# Patient Record
Sex: MALE | Race: WHITE | NOT HISPANIC OR LATINO | Employment: FULL TIME | URBAN - METROPOLITAN AREA
[De-identification: names, ages, dates, MRNs, and addresses within clinical notes are randomized per-mention and may not be internally consistent; named-entity substitution may affect disease eponyms.]

---

## 2017-08-13 ENCOUNTER — OFFICE VISIT (OUTPATIENT)
Dept: URGENT CARE | Facility: CLINIC | Age: 57
End: 2017-08-13
Payer: COMMERCIAL

## 2017-08-13 VITALS
HEIGHT: 71 IN | WEIGHT: 152 LBS | RESPIRATION RATE: 16 BRPM | SYSTOLIC BLOOD PRESSURE: 111 MMHG | DIASTOLIC BLOOD PRESSURE: 74 MMHG | TEMPERATURE: 97 F | BODY MASS INDEX: 21.28 KG/M2 | OXYGEN SATURATION: 99 % | HEART RATE: 60 BPM

## 2017-08-13 DIAGNOSIS — S46.211A BICEPS MUSCLE STRAIN, RIGHT, INITIAL ENCOUNTER: Primary | ICD-10-CM

## 2017-08-13 PROCEDURE — 3008F BODY MASS INDEX DOCD: CPT | Mod: S$GLB,,, | Performed by: NURSE PRACTITIONER

## 2017-08-13 PROCEDURE — 99203 OFFICE O/P NEW LOW 30 MIN: CPT | Mod: S$GLB,,, | Performed by: NURSE PRACTITIONER

## 2017-08-13 RX ORDER — WARFARIN 7.5 MG/1
7.5 TABLET ORAL DAILY
COMMUNITY
End: 2017-12-29 | Stop reason: SDUPTHER

## 2017-08-13 RX ORDER — WARFARIN 10 MG/1
10 TABLET ORAL DAILY
COMMUNITY
End: 2017-12-29 | Stop reason: SDUPTHER

## 2017-08-13 NOTE — PROGRESS NOTES
Subjective:       Patient ID: Jigar Saxena is a 57 y.o. male.    Chief Complaint: Shoulder Injury (Right shoulder)    Patient states he hurt right shoulder a week ago picking up water and reinjuried it yesterday working out.Patient does take Coumadin. Wants to make sure it is not still bleeding. reinjured it yesterday while doing pull ups      Shoulder Injury    The incident occurred at the gym and at home. The right shoulder is affected. The incident occurred more than 1 week ago. Injury mechanism: lifting case of water,working out. The quality of the pain is described as stabbing (some times dull ache). The pain does not radiate. The pain is at a severity of 7/10. The pain is moderate. Pertinent negatives include no chest pain or numbness. He has tried ice and acetaminophen for the symptoms. The treatment provided mild relief.     Review of Systems   Constitution: Negative for weakness and malaise/fatigue.   HENT: Negative for nosebleeds.    Cardiovascular: Negative for chest pain and syncope.   Respiratory: Negative for shortness of breath.    Skin: Positive for color change (bruising to the right medial bicep).   Musculoskeletal: Positive for joint pain (right upper arm pain). Negative for back pain and neck pain.   Gastrointestinal: Negative for abdominal pain.   Genitourinary: Negative for hematuria.   Neurological: Negative for dizziness and numbness.   All other systems reviewed and are negative.      Objective:      Physical Exam   Constitutional: He is oriented to person, place, and time. He appears well-developed and well-nourished.   HENT:   Head: Normocephalic and atraumatic.   Eyes: Conjunctivae and EOM are normal. Pupils are equal, round, and reactive to light.   Neck: Normal range of motion. Neck supple.   Cardiovascular: Normal rate.    Pulmonary/Chest: Effort normal.   Musculoskeletal: Normal range of motion. He exhibits tenderness (right medial bicep. pt has FROM of the arm and shoulder, equal   bilat. ). He exhibits no edema or deformity.        Right shoulder: He exhibits normal range of motion and no tenderness.        Arms:  Neurological: He is alert and oriented to person, place, and time. He has normal reflexes.   Skin: Skin is warm and dry. Capillary refill takes less than 2 seconds. Bruising noted.        Psychiatric: He has a normal mood and affect.   Nursing note and vitals reviewed.      Assessment:       1. Biceps muscle strain, right, initial encounter        Plan:       Jigar was seen today for shoulder injury.    Diagnoses and all orders for this visit:    Biceps muscle strain, right, initial encounter  -     Ambulatory referral to Orthopedics

## 2017-08-13 NOTE — PATIENT INSTRUCTIONS
An Orthopedic referral has been placed for you. If symptoms do not resolve please follow up.   Please use Tylenol for your pain.   Rest, ice, and elevate the affected extremity.   Please avoid heavy lifting until symptoms resolve or you are seen by Orthopedics.    Muscle Strain in the Extremities  A muscle strain is a stretching and tearing of muscle fibers. This causes pain, especially when you move that muscle. There may also be some swelling and bruising.  Home care  · Keep the hurt area raised to reduce pain and swelling. This is especially important during the first 48 hours.  · Apply an ice pack over the injured area for 15 to 20 minutes every 3 to 6 hours. You should do this for the first 24 to 48 hours. You can make an ice pack by filling a plastic bag that seals at the top with ice cubes and then wrapping it with a thin towel. Be careful not to injure your skin with the ice treatments. Ice should never be applied directly to skin. Continue the use of ice packs for relief of pain and swelling as needed. After 48 hours, apply heat (warm shower or warm bath) for 15 to 20 minutes several times a day, or alternate ice and heat.  · You may use over-the-counter pain medicine to control pain, unless another medicine was prescribed. If you have chronic liver or kidney disease or ever had a stomach ulcer or GI bleeding, talk with your healthcare provider before using these medicines.  · For leg strains: If crutches have been recommended, dont put full weight on the hurt leg until you can do so without pain. You can return to sports when you are able to hop and run on the injured leg without pain.  Follow-up care  Follow up with your healthcare provider, or as advised.  When to seek medical advice  Call your healthcare provider right away if any of these occur:  · The toes of the injured leg become swollen, cold, blue, numb, or tingly  · Pain or swelling increases  Date Last Reviewed: 11/19/2015  © 5131-0418 [employer]. [address] [place] [id number]. All rights reserved. This information is not intended as a substitute for professional medical care. Always follow your healthcare professional's instructions.        When Your Child Has a Strain, Sprain, or Contusion  Strains, sprains, and contusions are common injuries in active children. These injuries are similar, but involve different types of body tissue. Most of these injuries happen during sports or active play. But they can happen at any time. A strain, sprain, or contusion can be painful. With the right treatment, most heal with no lasting problems.        A strain is damage to a muscle or tendon.         A sprain is damage to a ligament.         A contusion (bruise) is caused by damage to blood vessels in and under the skin.      What is a strain?  A strain is an injury to a muscle or to a tendon (tissue that connects muscle to bone). It is sometimes called a pulled muscle. A strain happens when a muscle or tendon is stretched too far or is partially torn. Symptoms of a strain are pain, swelling, and having a problem moving or using the injured area. The hamstring (thigh muscle), calf muscle, and Achilles tendon are commonly strained.   What is a sprain?  A sprain is an injury to a ligament (tissue that connects bones to other bones). Joints contain many ligaments. A sprain results when a joint is twisted or pulled and the ligament stretches or tears. Symptoms of a sprain are pain, swelling, and having a problem moving or using the injured area. Ankles, knees, and wrists are the joints most commonly sprained.   What is a contusion?  A contusion is commonly called a bruise. It is injury to tissue that causes bleeding without breaking the skin. It is often a result of being hit by a blunt object, such as a ball or bat. Symptoms of a contusion are discoloration of the skin, pain (which can be severe), and swelling. Contusions usually arent  serious and usually dont need medical attention. But a large, painful, or very swollen bruise, or a bruise that limits movement of a joint such as the knee, should be seen by a healthcare provider.   How are strains, sprains, and contusions diagnosed?  The healthcare provider asks about your childs symptoms and medical history. An exam is also done. An X-ray (test that creates images of bones) may be done to rule out broken bones.  How are strains, sprains, and contusions treated?  · Strains and sprains can take up to months to heal. If not treated and allowed to heal, a strain or sprain can lead to long-term problems. These include lasting pain and stiffness. So it is important to follow the healthcare providers instructions.  · The pain of a contusion often resolves within the first week. But the swelling and discoloration may take weeks to go away.  Treatment consists of one or more of the following:  · RICE (which stands for Rest, Ice, Compression, and Elevation)  ¨ Rest. As much as possible, the child should not use the injured area. In some cases, your child may be given a brace or sling to keep an injured joint still. Your child may also be given crutches to keep some weight off a strain to the leg or a sprain to the ankle or knee.  ¨ Ice. Put ice on the injured area 3 to 4 times a day for 20 minutes at a time. Use an ice pack or bag of frozen peas wrapped in a thin towel. Never put ice directly on your child's skin.  ¨ Compression. If instructed, wrap the area to keep swelling down. Use an elastic bandage. Do this only as instructed by your childs healthcare provider.  ¨ Elevation. Have your child raise the injured body part above the level of his or her heart.  · Medicines to relieve inflammation and pain. These will likely be NSAIDs (nonsteroidal anti-inflammatory medicines). NSAIDs include ibuprofen and naproxen. Give these medicines to your child only as directed by your hospitals healthcare  provider.  · Physical therapy (PT) to strengthen the injured area. This is especially helpful for moderate to severe strains or sprains.  · Casting of the affected area to keep it still and allow the strain or sprain to heal.  · Surgery may be needed if the strain or sprain is severe and there is tearing. During surgery, the torn muscle, tendon, or ligament is repaired.  What are the long-term concerns?  If allowed to heal, most strains, sprains, and contusions cause no further problems. Strains or sprains that are not treated and dont heal properly can lead to pain or stiffness that doesnt go away. Be sure to follow your childs treatment plan. Your childs healthcare provider can tell you more about the expected outcome based on your childs injury.     Preventing strains, sprains, and contusions  If playing sports or doing other athletic activity, be sure your child:  · Has proper training.  · Wears protective gear.  · Warms up before activity and cools down afterward.  · Uses proper equipment.  · Doesnt play hurt (with an injury).   Date Last Reviewed: 11/18/2015 © 2000-2016 [employer]. [address] [place] [address]. All rights reserved. This information is not intended as a substitute for professional medical care. Always follow your healthcare professional's instructions.

## 2017-09-26 ENCOUNTER — OFFICE VISIT (OUTPATIENT)
Dept: FAMILY MEDICINE | Facility: CLINIC | Age: 57
End: 2017-09-26
Payer: COMMERCIAL

## 2017-09-26 ENCOUNTER — ANTI-COAG VISIT (OUTPATIENT)
Dept: CARDIOLOGY | Facility: CLINIC | Age: 57
End: 2017-09-26

## 2017-09-26 ENCOUNTER — LAB VISIT (OUTPATIENT)
Dept: LAB | Facility: HOSPITAL | Age: 57
End: 2017-09-26
Attending: INTERNAL MEDICINE
Payer: COMMERCIAL

## 2017-09-26 VITALS
DIASTOLIC BLOOD PRESSURE: 78 MMHG | SYSTOLIC BLOOD PRESSURE: 104 MMHG | TEMPERATURE: 98 F | BODY MASS INDEX: 21.11 KG/M2 | WEIGHT: 149.25 LBS

## 2017-09-26 DIAGNOSIS — Z79.01 CHRONIC ANTICOAGULATION: ICD-10-CM

## 2017-09-26 DIAGNOSIS — Z00.00 ANNUAL PHYSICAL EXAM: Primary | ICD-10-CM

## 2017-09-26 DIAGNOSIS — D68.59 PROTEIN S DEFICIENCY: ICD-10-CM

## 2017-09-26 DIAGNOSIS — Z79.01 LONG TERM (CURRENT) USE OF ANTICOAGULANTS: ICD-10-CM

## 2017-09-26 DIAGNOSIS — L21.9 SEBORRHEIC DERMATITIS: ICD-10-CM

## 2017-09-26 DIAGNOSIS — Z23 NEED FOR PROPHYLACTIC VACCINATION AND INOCULATION AGAINST INFLUENZA: ICD-10-CM

## 2017-09-26 DIAGNOSIS — Z00.00 ANNUAL PHYSICAL EXAM: ICD-10-CM

## 2017-09-26 LAB
ALBUMIN SERPL BCP-MCNC: 4 G/DL
ALP SERPL-CCNC: 62 U/L
ALT SERPL W/O P-5'-P-CCNC: 28 U/L
ANION GAP SERPL CALC-SCNC: 6 MMOL/L
AST SERPL-CCNC: 27 U/L
BASOPHILS # BLD AUTO: 0.01 K/UL
BASOPHILS NFR BLD: 0.2 %
BILIRUB SERPL-MCNC: 0.3 MG/DL
BUN SERPL-MCNC: 23 MG/DL
CALCIUM SERPL-MCNC: 9.4 MG/DL
CHLORIDE SERPL-SCNC: 104 MMOL/L
CHOLEST SERPL-MCNC: 208 MG/DL
CHOLEST/HDLC SERPL: 2.8 {RATIO}
CO2 SERPL-SCNC: 29 MMOL/L
CREAT SERPL-MCNC: 1.1 MG/DL
DIFFERENTIAL METHOD: ABNORMAL
EOSINOPHIL # BLD AUTO: 0.1 K/UL
EOSINOPHIL NFR BLD: 1.8 %
ERYTHROCYTE [DISTWIDTH] IN BLOOD BY AUTOMATED COUNT: 13.9 %
EST. GFR  (AFRICAN AMERICAN): >60 ML/MIN/1.73 M^2
EST. GFR  (NON AFRICAN AMERICAN): >60 ML/MIN/1.73 M^2
GLUCOSE SERPL-MCNC: 88 MG/DL
HCT VFR BLD AUTO: 43.4 %
HDLC SERPL-MCNC: 73 MG/DL
HDLC SERPL: 35.1 %
HGB BLD-MCNC: 14.3 G/DL
INR PPP: 2.4
LDLC SERPL CALC-MCNC: 124.6 MG/DL
LYMPHOCYTES # BLD AUTO: 1.9 K/UL
LYMPHOCYTES NFR BLD: 32.4 %
MCH RBC QN AUTO: 31.2 PG
MCHC RBC AUTO-ENTMCNC: 32.9 G/DL
MCV RBC AUTO: 95 FL
MONOCYTES # BLD AUTO: 0.4 K/UL
MONOCYTES NFR BLD: 5.8 %
NEUTROPHILS # BLD AUTO: 3.6 K/UL
NEUTROPHILS NFR BLD: 59.6 %
NONHDLC SERPL-MCNC: 135 MG/DL
PLATELET # BLD AUTO: 197 K/UL
PMV BLD AUTO: 11.8 FL
POTASSIUM SERPL-SCNC: 4.8 MMOL/L
PROT SERPL-MCNC: 6.9 G/DL
PROTHROMBIN TIME: 24.2 SEC
RBC # BLD AUTO: 4.58 M/UL
SODIUM SERPL-SCNC: 139 MMOL/L
TRIGL SERPL-MCNC: 52 MG/DL
WBC # BLD AUTO: 5.99 K/UL

## 2017-09-26 PROCEDURE — 80053 COMPREHEN METABOLIC PANEL: CPT

## 2017-09-26 PROCEDURE — 99386 PREV VISIT NEW AGE 40-64: CPT | Mod: 25,S$GLB,, | Performed by: INTERNAL MEDICINE

## 2017-09-26 PROCEDURE — 85610 PROTHROMBIN TIME: CPT

## 2017-09-26 PROCEDURE — 90471 IMMUNIZATION ADMIN: CPT | Mod: S$GLB,,, | Performed by: INTERNAL MEDICINE

## 2017-09-26 PROCEDURE — 90686 IIV4 VACC NO PRSV 0.5 ML IM: CPT | Mod: S$GLB,,, | Performed by: INTERNAL MEDICINE

## 2017-09-26 PROCEDURE — 99999 PR PBB SHADOW E&M-EST. PATIENT-LVL III: CPT | Mod: PBBFAC,,, | Performed by: INTERNAL MEDICINE

## 2017-09-26 PROCEDURE — 36415 COLL VENOUS BLD VENIPUNCTURE: CPT | Mod: PO

## 2017-09-26 PROCEDURE — 85025 COMPLETE CBC W/AUTO DIFF WBC: CPT

## 2017-09-26 PROCEDURE — 80061 LIPID PANEL: CPT

## 2017-09-26 RX ORDER — ASPIRIN 81 MG/1
81 TABLET ORAL DAILY
COMMUNITY
End: 2022-04-12 | Stop reason: ALTCHOICE

## 2017-09-26 RX ORDER — MULTIVITAMIN
1 TABLET ORAL DAILY
COMMUNITY

## 2017-09-26 NOTE — PROGRESS NOTES
Two patient identifiers verified.  Allergies reviewed.  Flu vaccine  IM administered to right deltoid per MD order.  Patient tolerated injection well; no redness, bleeding, or bruising noted to injection site.  Patient instructed to remain in clinic setting for 15 minutes.  Verbalizes understanding.  Flu consent signed by patient. Flu vaccine administered.

## 2017-09-26 NOTE — PROGRESS NOTES
"58yo M with PMHx: protein S deficiency and seborrheic dermatitis.  I was able to reach the pt on 9/27.  He has been on coumadin for ~27 years and reports no issues with bleeding or bruising.  He was previously having levels monitored in [place], [place] and has moved to the local area.  He reports having Warfarin 5 and 10mg tablets and taking the dose in the calendar.  He reports stable, therapeutic INRs and had his last INR ~ 6 weeks ago.  He takes his warfarin in the evenings and eats "a lot of" greens, roughly 3-4 portions/week along with his MVI.  His INR this week was therapeutic, so he will continue his current dose and re-check another level in ~ 3 weeks.  He did not feel the need for a new pt education session and has our contact info.  "

## 2017-09-26 NOTE — PROGRESS NOTES
Subjective:        Patient ID: Jigar Saxena is a 57 y.o. male.    Chief Complaint: Establish Care    HPI   Jigar Saxena presents for annual exam and to establish care.  Pt moved to [facility] 2 months ago from [place] his wife started law school at [employer].    Pt has a hx of protein S deficiency for which he is chronically anticoagulated with coumadin.  He reports no issues with bleeding and his INR has been stable for a long time.  He usually gets his INR check q6 weeks, last was 3 months ago.    No new complaints today.    Review of Systems   Constitutional: Negative for activity change and unexpected weight change.   HENT: Negative for ear pain, hearing loss, sore throat and trouble swallowing.    Eyes: Negative for visual disturbance (glasses for driving at night and reading glasses).   Respiratory: Negative for chest tightness and shortness of breath.    Cardiovascular: Negative for chest pain and leg swelling.   Gastrointestinal: Negative for abdominal pain, blood in stool, constipation and diarrhea.        - no melena   Genitourinary: Negative for difficulty urinating, dysuria and hematuria.   Musculoskeletal: Negative for arthralgias and back pain.   Skin: Negative for rash.        - flaking of skin along eyebrows, eyelids, sides of nose 2/2 seb derm   Neurological: Negative for dizziness and headaches.   Hematological: Does not bruise/bleed easily.   Psychiatric/Behavioral: Negative for dysphoric mood and sleep disturbance. The patient is not nervous/anxious.            Objective:        Vitals:    09/26/17 0945   BP: 104/78   Temp: 97.8 °F (36.6 °C)     Physical Exam   Constitutional: He is oriented to person, place, and time. He appears well-developed and well-nourished. No distress.   HENT:   Head: Normocephalic and atraumatic.   Right Ear: External ear normal.   Left Ear: External ear normal.   Nose: Nose normal.   Mouth/Throat: Oropharynx is clear and moist. No oropharyngeal exudate.   -  bilateral ear canals clear, tympanic membranes visualized - normal color and light reflex   Eyes: Conjunctivae and EOM are normal.   Neck: Normal range of motion. Neck supple. No thyromegaly present.   Cardiovascular: Normal rate, regular rhythm and normal heart sounds.    No murmur heard.  Pulmonary/Chest: Effort normal and breath sounds normal. No respiratory distress.   Abdominal: Soft. Bowel sounds are normal. He exhibits no distension and no mass. There is no tenderness. There is no guarding.   Musculoskeletal: He exhibits no edema.   Lymphadenopathy:     He has no cervical adenopathy.   Neurological: He is alert and oriented to person, place, and time.   Skin: Skin is warm and dry. No rash noted. No erythema.   Psychiatric: He has a normal mood and affect. His behavior is normal. Judgment and thought content normal.   Vitals reviewed.          Assessment:         1. Annual physical exam    2. Protein S deficiency    3. Chronic anticoagulation    4. Seborrheic dermatitis    5. Need for prophylactic vaccination and inoculation against influenza              Plan:         Jigar was seen today for establish care.    Diagnoses and all orders for this visit:    Annual physical exam  - labs, flu shot today  - pt due for cscope but would like to postpone to next summer since his wife is in school and won't be able to drive him  - discussed prostate cancer screening; pt has had negative screening in the past and has no famhx of early or any prostate CA; consider screening every other year, starting next year  -     Comprehensive metabolic panel; Future  -     CBC auto differential; Future  -     Lipid panel; Future    Protein S deficiency  Chronic anticoagulation: Coumadin 10mg qd 4 days/week, 7.5mg qd 3 days/week.  INR today and referral to coumadin clinic.  -     Ambulatory Referral to Anticoagulation Monitoring  -     Protime-INR; Future    Seborrheic dermatitis: no acute issues; pt uses topical hydrocortisone cream  PRN    Need for prophylactic vaccination and inoculation against influenza  -     Flu Vaccine - Quadrivalent (PF) (3 years & older)        Follow up pending lab results.

## 2017-09-27 PROBLEM — Z79.01 LONG TERM (CURRENT) USE OF ANTICOAGULANTS: Status: ACTIVE | Noted: 2017-09-27

## 2017-10-20 ENCOUNTER — ANTI-COAG VISIT (OUTPATIENT)
Dept: CARDIOLOGY | Facility: CLINIC | Age: 57
End: 2017-10-20
Payer: COMMERCIAL

## 2017-10-20 DIAGNOSIS — Z79.01 LONG-TERM (CURRENT) USE OF ANTICOAGULANTS: Primary | ICD-10-CM

## 2017-10-20 DIAGNOSIS — D68.59 PROTEIN S DEFICIENCY: ICD-10-CM

## 2017-10-20 LAB — INR PPP: 1.8 (ref 2–3)

## 2017-10-20 PROCEDURE — 85610 PROTHROMBIN TIME: CPT | Mod: QW,S$GLB,,

## 2017-10-20 PROCEDURE — 99211 OFF/OP EST MAY X REQ PHY/QHP: CPT | Mod: 25,S$GLB,,

## 2017-10-20 NOTE — PROGRESS NOTES
Patient has been on warfarin for over 30 years. He denies any history of bleeding or bruising complications. His INR is slightly low today, which is his first nontherapeutic INR in 18 months, from peanut consumption. He will avoid peanuts which should improve his INR. He will take a boosted dose today and resume his previously stable dose until follow-up. I advised him to contact us with any changes or problems.

## 2017-11-13 ENCOUNTER — ANTI-COAG VISIT (OUTPATIENT)
Dept: CARDIOLOGY | Facility: CLINIC | Age: 57
End: 2017-11-13
Payer: COMMERCIAL

## 2017-11-13 DIAGNOSIS — Z79.01 LONG-TERM (CURRENT) USE OF ANTICOAGULANTS: Primary | ICD-10-CM

## 2017-11-13 DIAGNOSIS — D68.59 PROTEIN S DEFICIENCY: ICD-10-CM

## 2017-11-13 LAB — INR PPP: 2.2 (ref 2–3)

## 2017-11-13 PROCEDURE — 85610 PROTHROMBIN TIME: CPT | Mod: QW,S$GLB,,

## 2017-11-13 PROCEDURE — 99211 OFF/OP EST MAY X REQ PHY/QHP: CPT | Mod: 25,S$GLB,,

## 2017-11-13 NOTE — PROGRESS NOTES
This was a quick follow-up from his subtherapeutic INR 10/20. INR back within range. He will maintain this dose until follow-up. He denies any changes or problems. I advised him to contact us with any changes or problems.

## 2017-12-29 ENCOUNTER — ANTI-COAG VISIT (OUTPATIENT)
Dept: CARDIOLOGY | Facility: CLINIC | Age: 57
End: 2017-12-29
Payer: COMMERCIAL

## 2017-12-29 DIAGNOSIS — Z79.01 LONG-TERM (CURRENT) USE OF ANTICOAGULANTS: Primary | ICD-10-CM

## 2017-12-29 DIAGNOSIS — D68.59 PROTEIN S DEFICIENCY: ICD-10-CM

## 2017-12-29 LAB — INR PPP: 2.1 (ref 2–3)

## 2017-12-29 PROCEDURE — 99211 OFF/OP EST MAY X REQ PHY/QHP: CPT | Mod: 25,S$GLB,,

## 2017-12-29 PROCEDURE — 85610 PROTHROMBIN TIME: CPT | Mod: QW,S$GLB,,

## 2017-12-29 RX ORDER — WARFARIN 10 MG/1
TABLET ORAL
Qty: 20 TABLET | Refills: 6 | Status: SHIPPED | OUTPATIENT
Start: 2017-12-29 | End: 2018-08-22 | Stop reason: SDUPTHER

## 2017-12-29 RX ORDER — WARFARIN 7.5 MG/1
TABLET ORAL
Qty: 20 TABLET | Refills: 6 | Status: SHIPPED | OUTPATIENT
Start: 2017-12-29 | End: 2018-10-24 | Stop reason: SDUPTHER

## 2017-12-29 NOTE — PROGRESS NOTES
Patient seen by Velia LUA. I have reviewed her initial findings and agree with her assessment.  Care plan made together. Patient was using 5mg and 10 tablets but would like to change to 7.5mg and 10mg tablets for his dosing. Prescriptions sent in.

## 2017-12-29 NOTE — PROGRESS NOTES
INR within normal range today. Patient with bruises from use. Denies any bleeding or other changes. Patient is stable on this dose. He will continue this dose until follow-up in 5 weeks. I advised him to contact us with any changes or problems.

## 2018-02-02 ENCOUNTER — ANTI-COAG VISIT (OUTPATIENT)
Dept: CARDIOLOGY | Facility: CLINIC | Age: 58
End: 2018-02-02
Payer: COMMERCIAL

## 2018-02-02 DIAGNOSIS — D68.59 PROTEIN S DEFICIENCY: ICD-10-CM

## 2018-02-02 DIAGNOSIS — Z79.01 LONG TERM (CURRENT) USE OF ANTICOAGULANTS: Primary | ICD-10-CM

## 2018-02-02 LAB — INR PPP: 2.4 (ref 2–3)

## 2018-02-02 PROCEDURE — 85610 PROTHROMBIN TIME: CPT | Mod: QW,S$GLB,,

## 2018-02-02 NOTE — PROGRESS NOTES
INR good. No changes in medications, health, or diet. No signs or symptoms of bleeding. INR stable. Maintain current dose and repeat INR in 6 weeks.

## 2018-03-16 ENCOUNTER — ANTI-COAG VISIT (OUTPATIENT)
Dept: CARDIOLOGY | Facility: CLINIC | Age: 58
End: 2018-03-16
Payer: COMMERCIAL

## 2018-03-16 DIAGNOSIS — Z79.01 LONG TERM (CURRENT) USE OF ANTICOAGULANTS: Primary | ICD-10-CM

## 2018-03-16 DIAGNOSIS — D68.59 PROTEIN S DEFICIENCY: ICD-10-CM

## 2018-03-16 LAB — INR PPP: 2.2 (ref 2–3)

## 2018-03-16 PROCEDURE — 99211 OFF/OP EST MAY X REQ PHY/QHP: CPT | Mod: 25,S$GLB,, | Performed by: PHARMACIST

## 2018-03-16 PROCEDURE — 85610 PROTHROMBIN TIME: CPT | Mod: QW,S$GLB,, | Performed by: PHARMACIST

## 2018-03-16 NOTE — PROGRESS NOTES
INR within normal range today. Patient with bruises from use, he also states that he has a cold right now. Denies any bleeding or other changes. Patient is stable on this dose. He will continue this dose until follow-up in 6 weeks. I advised him to contact us with any changes or problems.

## 2018-04-27 ENCOUNTER — ANTI-COAG VISIT (OUTPATIENT)
Dept: CARDIOLOGY | Facility: CLINIC | Age: 58
End: 2018-04-27
Payer: COMMERCIAL

## 2018-04-27 DIAGNOSIS — Z79.01 LONG TERM (CURRENT) USE OF ANTICOAGULANTS: Primary | ICD-10-CM

## 2018-04-27 DIAGNOSIS — D68.59 PROTEIN S DEFICIENCY: ICD-10-CM

## 2018-04-27 LAB — INR PPP: 2 (ref 2–3)

## 2018-04-27 PROCEDURE — 85610 PROTHROMBIN TIME: CPT | Mod: QW,S$GLB,, | Performed by: INTERNAL MEDICINE

## 2018-04-27 PROCEDURE — 99211 OFF/OP EST MAY X REQ PHY/QHP: CPT | Mod: 25,S$GLB,, | Performed by: INTERNAL MEDICINE

## 2018-04-27 NOTE — PROGRESS NOTES
Patient reports no bleeding, no new medications and no diet changes.  He does report some bruising on his arms from use.  I reminded the patient to call with any problems, changes or questions before the next visit.  I have reviewed the student's initial findings and agree with their assessment.  I have personally spoken with and assessed the patient in clinic to devise care plan.

## 2018-06-15 ENCOUNTER — ANTI-COAG VISIT (OUTPATIENT)
Dept: CARDIOLOGY | Facility: CLINIC | Age: 58
End: 2018-06-15
Payer: COMMERCIAL

## 2018-06-15 DIAGNOSIS — Z79.01 LONG TERM (CURRENT) USE OF ANTICOAGULANTS: Primary | ICD-10-CM

## 2018-06-15 DIAGNOSIS — D68.59 PROTEIN S DEFICIENCY: ICD-10-CM

## 2018-06-15 LAB — INR PPP: 2.3 (ref 2–3)

## 2018-06-15 PROCEDURE — 99211 OFF/OP EST MAY X REQ PHY/QHP: CPT | Mod: 25,S$GLB,,

## 2018-06-15 PROCEDURE — 85610 PROTHROMBIN TIME: CPT | Mod: QW,S$GLB,,

## 2018-06-15 NOTE — PROGRESS NOTES
INR within normal range today. Patient with bruises on arms from use, denies any bleeding or changes. Patient is stable on this dose. He will continue this dose until follow-up in 6 weeks. I advised him to contact us with any changes or problems.

## 2018-08-20 DIAGNOSIS — D68.59 PROTEIN S DEFICIENCY: ICD-10-CM

## 2018-08-20 DIAGNOSIS — Z79.01 LONG-TERM (CURRENT) USE OF ANTICOAGULANTS: ICD-10-CM

## 2018-08-20 RX ORDER — WARFARIN 10 MG/1
TABLET ORAL
Qty: 20 TABLET | Refills: 6 | Status: CANCELLED | OUTPATIENT
Start: 2018-08-20

## 2018-08-21 NOTE — PROGRESS NOTES
Patient called to reschedule 7/27/18 missed coumadin clinic appointment, it was rescheduled to 8/31

## 2018-08-22 RX ORDER — WARFARIN 10 MG/1
TABLET ORAL
Qty: 10 TABLET | Refills: 0 | Status: SHIPPED | OUTPATIENT
Start: 2018-08-22 | End: 2018-09-16 | Stop reason: SDUPTHER

## 2018-08-31 ENCOUNTER — ANTI-COAG VISIT (OUTPATIENT)
Dept: CARDIOLOGY | Facility: CLINIC | Age: 58
End: 2018-08-31
Payer: COMMERCIAL

## 2018-08-31 DIAGNOSIS — D68.59 PROTEIN S DEFICIENCY: ICD-10-CM

## 2018-08-31 DIAGNOSIS — Z79.01 LONG TERM (CURRENT) USE OF ANTICOAGULANTS: Primary | ICD-10-CM

## 2018-08-31 LAB — INR PPP: 2.8 (ref 2–3)

## 2018-08-31 PROCEDURE — 99211 OFF/OP EST MAY X REQ PHY/QHP: CPT | Mod: 25,S$GLB,,

## 2018-08-31 PROCEDURE — 85610 PROTHROMBIN TIME: CPT | Mod: QW,S$GLB,,

## 2018-09-16 DIAGNOSIS — D68.59 PROTEIN S DEFICIENCY: ICD-10-CM

## 2018-09-16 DIAGNOSIS — Z79.01 LONG TERM (CURRENT) USE OF ANTICOAGULANTS: ICD-10-CM

## 2018-09-17 RX ORDER — WARFARIN 10 MG/1
TABLET ORAL
Qty: 16 TABLET | Refills: 11 | Status: SHIPPED | OUTPATIENT
Start: 2018-09-17 | End: 2019-08-12 | Stop reason: SDUPTHER

## 2018-10-19 ENCOUNTER — ANTI-COAG VISIT (OUTPATIENT)
Dept: CARDIOLOGY | Facility: CLINIC | Age: 58
End: 2018-10-19
Payer: COMMERCIAL

## 2018-10-19 DIAGNOSIS — Z79.01 LONG TERM (CURRENT) USE OF ANTICOAGULANTS: Primary | ICD-10-CM

## 2018-10-19 DIAGNOSIS — D68.59 PROTEIN S DEFICIENCY: ICD-10-CM

## 2018-10-19 LAB — INR PPP: 2.9 (ref 2–3)

## 2018-10-19 PROCEDURE — 99211 OFF/OP EST MAY X REQ PHY/QHP: CPT | Mod: 25,S$GLB,, | Performed by: PHARMACIST

## 2018-10-19 PROCEDURE — 85610 PROTHROMBIN TIME: CPT | Mod: QW,S$GLB,, | Performed by: PHARMACIST

## 2018-10-19 NOTE — PROGRESS NOTES
INR within normal range today. Patient states that he has a cold right now, no meds but some decreased appetite. He has bruises on arms from use, denies any bleeding or other changes. Patient is stable on this dose. He will continue this dose until follow-up in 6 weeks. I advised him to contact us with any changes or problems.

## 2018-10-24 DIAGNOSIS — D68.59 PROTEIN S DEFICIENCY: ICD-10-CM

## 2018-10-24 DIAGNOSIS — Z79.01 LONG TERM CURRENT USE OF ANTICOAGULANT THERAPY: ICD-10-CM

## 2018-10-24 RX ORDER — WARFARIN 7.5 MG/1
TABLET ORAL
Qty: 20 TABLET | Refills: 0 | Status: SHIPPED | OUTPATIENT
Start: 2018-10-24 | End: 2019-01-06 | Stop reason: SDUPTHER

## 2018-12-03 ENCOUNTER — ANTI-COAG VISIT (OUTPATIENT)
Dept: CARDIOLOGY | Facility: CLINIC | Age: 58
End: 2018-12-03
Payer: COMMERCIAL

## 2018-12-03 DIAGNOSIS — D68.59 PROTEIN S DEFICIENCY: ICD-10-CM

## 2018-12-03 DIAGNOSIS — Z79.01 LONG TERM (CURRENT) USE OF ANTICOAGULANTS: Primary | ICD-10-CM

## 2018-12-03 LAB — INR PPP: 1.8 (ref 2–3)

## 2018-12-03 PROCEDURE — 85610 PROTHROMBIN TIME: CPT | Mod: QW,S$GLB,, | Performed by: INTERNAL MEDICINE

## 2018-12-03 NOTE — PROGRESS NOTES
INR today is below range. Patient reports no changes to explain this level. He does have bruises from use and denies any bleeding. Will boost and resume since typically stable. Will recheck INR in 2.5 weeks. Patient advised to contact us with any changes or problems.

## 2018-12-26 ENCOUNTER — LAB VISIT (OUTPATIENT)
Dept: LAB | Facility: HOSPITAL | Age: 58
End: 2018-12-26
Attending: INTERNAL MEDICINE
Payer: COMMERCIAL

## 2018-12-26 ENCOUNTER — ANTI-COAG VISIT (OUTPATIENT)
Dept: CARDIOLOGY | Facility: CLINIC | Age: 58
End: 2018-12-26

## 2018-12-26 DIAGNOSIS — Z79.01 LONG TERM (CURRENT) USE OF ANTICOAGULANTS: ICD-10-CM

## 2018-12-26 DIAGNOSIS — D68.59 PROTEIN S DEFICIENCY: ICD-10-CM

## 2018-12-26 LAB
INR PPP: 2.4
PROTHROMBIN TIME: 22.8 SEC

## 2018-12-26 PROCEDURE — 36415 COLL VENOUS BLD VENIPUNCTURE: CPT | Mod: PO

## 2018-12-26 PROCEDURE — 85610 PROTHROMBIN TIME: CPT

## 2019-01-04 ENCOUNTER — TELEPHONE (OUTPATIENT)
Dept: FAMILY MEDICINE | Facility: CLINIC | Age: 59
End: 2019-01-04

## 2019-01-04 DIAGNOSIS — Z79.01 LONG TERM (CURRENT) USE OF ANTICOAGULANTS: ICD-10-CM

## 2019-01-04 DIAGNOSIS — Z00.00 ROUTINE GENERAL MEDICAL EXAMINATION AT A HEALTH CARE FACILITY: ICD-10-CM

## 2019-01-04 DIAGNOSIS — Z79.01 CHRONIC ANTICOAGULATION: Primary | ICD-10-CM

## 2019-01-06 DIAGNOSIS — Z79.01 LONG TERM CURRENT USE OF ANTICOAGULANT THERAPY: ICD-10-CM

## 2019-01-06 DIAGNOSIS — D68.59 PROTEIN S DEFICIENCY: ICD-10-CM

## 2019-01-07 RX ORDER — WARFARIN 7.5 MG/1
TABLET ORAL
Qty: 20 TABLET | Refills: 3 | Status: SHIPPED | OUTPATIENT
Start: 2019-01-07 | End: 2019-07-15 | Stop reason: SDUPTHER

## 2019-01-15 ENCOUNTER — ANTI-COAG VISIT (OUTPATIENT)
Dept: CARDIOLOGY | Facility: CLINIC | Age: 59
End: 2019-01-15
Payer: COMMERCIAL

## 2019-01-15 DIAGNOSIS — Z79.01 LONG TERM (CURRENT) USE OF ANTICOAGULANTS: Primary | ICD-10-CM

## 2019-01-15 DIAGNOSIS — D68.59 PROTEIN S DEFICIENCY: ICD-10-CM

## 2019-01-15 LAB — INR PPP: 2.8 (ref 2–3)

## 2019-01-15 PROCEDURE — 85610 POCT INR: ICD-10-PCS | Mod: QW,S$GLB,, | Performed by: INTERNAL MEDICINE

## 2019-01-15 PROCEDURE — 85610 PROTHROMBIN TIME: CPT | Mod: QW,S$GLB,, | Performed by: INTERNAL MEDICINE

## 2019-01-15 NOTE — PROGRESS NOTES
Patient here for close monitoring due to recent low INR. INR back in range and stable. No new changes. No signs or symptoms of bleeding. We will continue on maintenance dose and resume routine INR.

## 2019-02-26 ENCOUNTER — TELEPHONE (OUTPATIENT)
Dept: FAMILY MEDICINE | Facility: CLINIC | Age: 59
End: 2019-02-26

## 2019-02-26 ENCOUNTER — ANTI-COAG VISIT (OUTPATIENT)
Dept: CARDIOLOGY | Facility: CLINIC | Age: 59
End: 2019-02-26
Payer: COMMERCIAL

## 2019-02-26 DIAGNOSIS — Z79.01 LONG TERM (CURRENT) USE OF ANTICOAGULANTS: Primary | ICD-10-CM

## 2019-02-26 DIAGNOSIS — D68.59 PROTEIN S DEFICIENCY: ICD-10-CM

## 2019-02-26 LAB — INR PPP: 2.8 (ref 2–3)

## 2019-02-26 PROCEDURE — 85610 PROTHROMBIN TIME: CPT | Mod: QW,S$GLB,, | Performed by: INTERNAL MEDICINE

## 2019-02-26 PROCEDURE — 85610 POCT INR: ICD-10-PCS | Mod: QW,S$GLB,, | Performed by: INTERNAL MEDICINE

## 2019-02-26 NOTE — TELEPHONE ENCOUNTER
----- Message from Brooke Ko sent at 2/26/2019  9:05 AM CST -----  Contact: fyi  Doctor appointment and lab have been scheduled.  Please link lab orders to the lab appointment.  Date of doctor appointment:  04/05/19  Physical or EP:  epp  Date of lab appointment:    Comments: 03/29/19

## 2019-02-26 NOTE — PROGRESS NOTES
INR within goal range today. Patient with bruises on arms and hands from use. Denies any bleeding or other changes. Patient is stable on this dose. He will continue this dose until follow-up in 6 weeks. I advised him to contact us with any changes or problems.

## 2019-03-29 ENCOUNTER — LAB VISIT (OUTPATIENT)
Dept: LAB | Facility: HOSPITAL | Age: 59
End: 2019-03-29
Attending: INTERNAL MEDICINE
Payer: COMMERCIAL

## 2019-03-29 DIAGNOSIS — Z00.00 ROUTINE GENERAL MEDICAL EXAMINATION AT A HEALTH CARE FACILITY: ICD-10-CM

## 2019-03-29 DIAGNOSIS — Z79.01 LONG TERM (CURRENT) USE OF ANTICOAGULANTS: ICD-10-CM

## 2019-03-29 DIAGNOSIS — Z79.01 CHRONIC ANTICOAGULATION: ICD-10-CM

## 2019-03-29 LAB
ALBUMIN SERPL BCP-MCNC: 3.8 G/DL (ref 3.5–5.2)
ALP SERPL-CCNC: 54 U/L (ref 55–135)
ALT SERPL W/O P-5'-P-CCNC: 23 U/L (ref 10–44)
ANION GAP SERPL CALC-SCNC: 6 MMOL/L (ref 8–16)
AST SERPL-CCNC: 30 U/L (ref 10–40)
BASOPHILS # BLD AUTO: 0.01 K/UL (ref 0–0.2)
BASOPHILS NFR BLD: 0.2 % (ref 0–1.9)
BILIRUB SERPL-MCNC: 0.5 MG/DL (ref 0.1–1)
BUN SERPL-MCNC: 18 MG/DL (ref 6–20)
CALCIUM SERPL-MCNC: 9.2 MG/DL (ref 8.7–10.5)
CHLORIDE SERPL-SCNC: 106 MMOL/L (ref 95–110)
CHOLEST SERPL-MCNC: 196 MG/DL (ref 120–199)
CHOLEST/HDLC SERPL: 3.1 {RATIO} (ref 2–5)
CO2 SERPL-SCNC: 28 MMOL/L (ref 23–29)
CREAT SERPL-MCNC: 1.1 MG/DL (ref 0.5–1.4)
DIFFERENTIAL METHOD: ABNORMAL
EOSINOPHIL # BLD AUTO: 0.1 K/UL (ref 0–0.5)
EOSINOPHIL NFR BLD: 2.3 % (ref 0–8)
ERYTHROCYTE [DISTWIDTH] IN BLOOD BY AUTOMATED COUNT: 13.1 % (ref 11.5–14.5)
EST. GFR  (AFRICAN AMERICAN): >60 ML/MIN/1.73 M^2
EST. GFR  (NON AFRICAN AMERICAN): >60 ML/MIN/1.73 M^2
GLUCOSE SERPL-MCNC: 82 MG/DL (ref 70–110)
HCT VFR BLD AUTO: 43.6 % (ref 40–54)
HDLC SERPL-MCNC: 63 MG/DL (ref 40–75)
HDLC SERPL: 32.1 % (ref 20–50)
HGB BLD-MCNC: 14 G/DL (ref 14–18)
IMM GRANULOCYTES # BLD AUTO: 0.01 K/UL (ref 0–0.04)
IMM GRANULOCYTES NFR BLD AUTO: 0.2 % (ref 0–0.5)
INR PPP: 2.4 (ref 0.8–1.2)
LDLC SERPL CALC-MCNC: 119.4 MG/DL (ref 63–159)
LYMPHOCYTES # BLD AUTO: 1.9 K/UL (ref 1–4.8)
LYMPHOCYTES NFR BLD: 33.5 % (ref 18–48)
MCH RBC QN AUTO: 31.6 PG (ref 27–31)
MCHC RBC AUTO-ENTMCNC: 32.1 G/DL (ref 32–36)
MCV RBC AUTO: 98 FL (ref 82–98)
MONOCYTES # BLD AUTO: 0.7 K/UL (ref 0.3–1)
MONOCYTES NFR BLD: 12 % (ref 4–15)
NEUTROPHILS # BLD AUTO: 3 K/UL (ref 1.8–7.7)
NEUTROPHILS NFR BLD: 51.8 % (ref 38–73)
NONHDLC SERPL-MCNC: 133 MG/DL
NRBC BLD-RTO: 0 /100 WBC
PLATELET # BLD AUTO: 198 K/UL (ref 150–350)
PMV BLD AUTO: 11.6 FL (ref 9.2–12.9)
POTASSIUM SERPL-SCNC: 4.5 MMOL/L (ref 3.5–5.1)
PROT SERPL-MCNC: 6.3 G/DL (ref 6–8.4)
PROTHROMBIN TIME: 23.6 SEC (ref 9–12.5)
RBC # BLD AUTO: 4.43 M/UL (ref 4.6–6.2)
SODIUM SERPL-SCNC: 140 MMOL/L (ref 136–145)
TRIGL SERPL-MCNC: 68 MG/DL (ref 30–150)
WBC # BLD AUTO: 5.68 K/UL (ref 3.9–12.7)

## 2019-03-29 PROCEDURE — 80061 LIPID PANEL: CPT

## 2019-03-29 PROCEDURE — 80053 COMPREHEN METABOLIC PANEL: CPT

## 2019-03-29 PROCEDURE — 85025 COMPLETE CBC W/AUTO DIFF WBC: CPT

## 2019-03-29 PROCEDURE — 36415 COLL VENOUS BLD VENIPUNCTURE: CPT | Mod: PO

## 2019-03-29 PROCEDURE — 85610 PROTHROMBIN TIME: CPT

## 2019-04-05 ENCOUNTER — OFFICE VISIT (OUTPATIENT)
Dept: FAMILY MEDICINE | Facility: CLINIC | Age: 59
End: 2019-04-05
Payer: COMMERCIAL

## 2019-04-05 VITALS
BODY MASS INDEX: 23.55 KG/M2 | SYSTOLIC BLOOD PRESSURE: 90 MMHG | WEIGHT: 168.19 LBS | RESPIRATION RATE: 20 BRPM | HEIGHT: 71 IN | TEMPERATURE: 97 F | DIASTOLIC BLOOD PRESSURE: 62 MMHG

## 2019-04-05 DIAGNOSIS — Z12.12 SCREENING FOR COLORECTAL CANCER: ICD-10-CM

## 2019-04-05 DIAGNOSIS — Z00.00 ANNUAL PHYSICAL EXAM: Primary | ICD-10-CM

## 2019-04-05 DIAGNOSIS — Z79.01 LONG TERM (CURRENT) USE OF ANTICOAGULANTS: ICD-10-CM

## 2019-04-05 DIAGNOSIS — Z12.11 SCREENING FOR COLORECTAL CANCER: ICD-10-CM

## 2019-04-05 DIAGNOSIS — D68.59 PROTEIN S DEFICIENCY: ICD-10-CM

## 2019-04-05 PROCEDURE — 99999 PR PBB SHADOW E&M-EST. PATIENT-LVL III: CPT | Mod: PBBFAC,,, | Performed by: INTERNAL MEDICINE

## 2019-04-05 PROCEDURE — 99396 PR PREVENTIVE VISIT,EST,40-64: ICD-10-PCS | Mod: S$GLB,,, | Performed by: INTERNAL MEDICINE

## 2019-04-05 PROCEDURE — 99999 PR PBB SHADOW E&M-EST. PATIENT-LVL III: ICD-10-PCS | Mod: PBBFAC,,, | Performed by: INTERNAL MEDICINE

## 2019-04-05 PROCEDURE — 99396 PREV VISIT EST AGE 40-64: CPT | Mod: S$GLB,,, | Performed by: INTERNAL MEDICINE

## 2019-04-05 NOTE — PROGRESS NOTES
Subjective:        Patient ID: Jigar Saxena is a 59 y.o. male.    Chief Complaint: Annual Exam    HPI   Jigar Saxena presents for annual exam.    No significant health changes since last visit.  Pt still exercising regularly.  No problems with coumadin.    Review of Systems   Constitutional: Negative for activity change and unexpected weight change.   HENT: Negative for hearing loss, rhinorrhea and trouble swallowing.    Eyes: Negative for discharge and visual disturbance (mild Rx for distance glasses, uses reading glasses).   Respiratory: Negative for chest tightness, shortness of breath and wheezing.    Cardiovascular: Negative for chest pain, palpitations and leg swelling.   Gastrointestinal: Negative for blood in stool, constipation, diarrhea and vomiting.   Endocrine: Negative for polydipsia and polyuria.   Genitourinary: Negative for difficulty urinating, hematuria and urgency.   Musculoskeletal: Negative for arthralgias, joint swelling and neck pain.   Skin: Negative for rash.   Neurological: Negative for weakness and headaches.   Hematological: Does not bruise/bleed easily.   Psychiatric/Behavioral: Negative for confusion and dysphoric mood.           Objective:        Vitals:    04/05/19 0756   BP: 90/62   Resp: 20   Temp: 97.4 °F (36.3 °C)     Physical Exam   Constitutional: He is oriented to person, place, and time. He appears well-developed and well-nourished. No distress.   HENT:   Head: Normocephalic and atraumatic.   Right Ear: External ear normal.   Left Ear: External ear normal.   Nose: Nose normal.   - bilateral ear canals clear, tympanic membranes visualized - normal color and light reflex   Eyes: Conjunctivae and EOM are normal.   Neck: Normal range of motion. Neck supple. No thyromegaly present.   Cardiovascular: Normal rate, regular rhythm and normal heart sounds.   No murmur heard.  Pulmonary/Chest: Effort normal and breath sounds normal. No respiratory distress.   Abdominal: Soft.  He exhibits no distension and no mass. There is no tenderness. There is no guarding.   Musculoskeletal: He exhibits no edema.   Lymphadenopathy:     He has no cervical adenopathy.   Neurological: He is alert and oriented to person, place, and time.   Skin: Skin is warm and dry.   1cm light brown SK on L shoulder  Cherry hemangiomas on torso   Psychiatric: He has a normal mood and affect. His behavior is normal. Judgment and thought content normal.   Vitals reviewed.        Most recent lab results reviewed with patient.    The 10-year ASCVD risk score (Leisamoose WALSH Jr., et al., 2013) is: 3.6%    Values used to calculate the score:      Age: 59 years      Sex: Male      Is Non- : No      Diabetic: No      Tobacco smoker: No      Systolic Blood Pressure: 90 mmHg      Is BP treated: No      HDL Cholesterol: 63 mg/dL      Total Cholesterol: 196 mg/dL    Assessment:         1. Annual physical exam    2. Protein S deficiency    3. Long term (current) use of anticoagulants    4. Screening for colorectal cancer              Plan:         Jigar was seen today for annual exam.    Diagnoses and all orders for this visit:    Annual physical exam  - Recommended Shingrix vaccine.  Check coverage with insurance first, then get vaccine from local pharmacy.  - FIT ordered    Protein S deficiency  Long term (current) use of anticoagulants: doing well on coumadin    Screening for colorectal cancer  -     Fecal Immunochemical Test (iFOBT); Future          Follow up in about 1 year (around 4/5/2020) for annual exam or sooner as needed.

## 2019-04-09 ENCOUNTER — ANTI-COAG VISIT (OUTPATIENT)
Dept: CARDIOLOGY | Facility: CLINIC | Age: 59
End: 2019-04-09
Payer: COMMERCIAL

## 2019-04-09 ENCOUNTER — LAB VISIT (OUTPATIENT)
Dept: LAB | Facility: HOSPITAL | Age: 59
End: 2019-04-09
Attending: INTERNAL MEDICINE
Payer: COMMERCIAL

## 2019-04-09 DIAGNOSIS — Z79.01 LONG TERM (CURRENT) USE OF ANTICOAGULANTS: Primary | ICD-10-CM

## 2019-04-09 DIAGNOSIS — D68.59 PROTEIN S DEFICIENCY: ICD-10-CM

## 2019-04-09 DIAGNOSIS — Z12.11 SCREENING FOR COLORECTAL CANCER: ICD-10-CM

## 2019-04-09 DIAGNOSIS — Z12.12 SCREENING FOR COLORECTAL CANCER: ICD-10-CM

## 2019-04-09 LAB — INR PPP: 2.9 (ref 2–3)

## 2019-04-09 PROCEDURE — 85610 PROTHROMBIN TIME: CPT | Mod: QW,S$GLB,, | Performed by: INTERNAL MEDICINE

## 2019-04-09 PROCEDURE — 93793 ANTICOAG MGMT PT WARFARIN: CPT | Mod: S$GLB,,,

## 2019-04-09 PROCEDURE — 93793 PR ANTICOAGULANT MGMT FOR PT TAKING WARFARIN: ICD-10-PCS | Mod: S$GLB,,,

## 2019-04-09 PROCEDURE — 82274 ASSAY TEST FOR BLOOD FECAL: CPT

## 2019-04-09 PROCEDURE — 85610 POCT INR: ICD-10-PCS | Mod: QW,S$GLB,, | Performed by: INTERNAL MEDICINE

## 2019-04-10 LAB — HEMOCCULT STL QL IA: NEGATIVE

## 2019-05-21 ENCOUNTER — ANTI-COAG VISIT (OUTPATIENT)
Dept: CARDIOLOGY | Facility: CLINIC | Age: 59
End: 2019-05-21
Payer: COMMERCIAL

## 2019-05-21 DIAGNOSIS — D68.59 PROTEIN S DEFICIENCY: ICD-10-CM

## 2019-05-21 DIAGNOSIS — Z79.01 LONG TERM (CURRENT) USE OF ANTICOAGULANTS: Primary | ICD-10-CM

## 2019-05-21 LAB — INR PPP: 2.2 (ref 2–3)

## 2019-05-21 PROCEDURE — 93793 PR ANTICOAGULANT MGMT FOR PT TAKING WARFARIN: ICD-10-PCS | Mod: S$GLB,,,

## 2019-05-21 PROCEDURE — 93793 ANTICOAG MGMT PT WARFARIN: CPT | Mod: S$GLB,,,

## 2019-05-21 PROCEDURE — 85610 PROTHROMBIN TIME: CPT | Mod: QW,S$GLB,, | Performed by: INTERNAL MEDICINE

## 2019-05-21 PROCEDURE — 85610 POCT INR: ICD-10-PCS | Mod: QW,S$GLB,, | Performed by: INTERNAL MEDICINE

## 2019-07-02 ENCOUNTER — ANTI-COAG VISIT (OUTPATIENT)
Dept: CARDIOLOGY | Facility: CLINIC | Age: 59
End: 2019-07-02
Payer: COMMERCIAL

## 2019-07-02 DIAGNOSIS — D68.59 PROTEIN S DEFICIENCY: ICD-10-CM

## 2019-07-02 DIAGNOSIS — Z79.01 LONG TERM (CURRENT) USE OF ANTICOAGULANTS: Primary | ICD-10-CM

## 2019-07-02 DIAGNOSIS — Z79.01 LONG TERM CURRENT USE OF ANTICOAGULANT THERAPY: ICD-10-CM

## 2019-07-02 LAB — INR PPP: 2.8 (ref 2–3)

## 2019-07-02 PROCEDURE — 85610 PROTHROMBIN TIME: CPT | Mod: QW,S$GLB,, | Performed by: INTERNAL MEDICINE

## 2019-07-02 PROCEDURE — 93793 PR ANTICOAGULANT MGMT FOR PT TAKING WARFARIN: ICD-10-PCS | Mod: S$GLB,,,

## 2019-07-02 PROCEDURE — 93793 ANTICOAG MGMT PT WARFARIN: CPT | Mod: S$GLB,,,

## 2019-07-02 PROCEDURE — 85610 POCT INR: ICD-10-PCS | Mod: QW,S$GLB,, | Performed by: INTERNAL MEDICINE

## 2019-07-14 DIAGNOSIS — Z79.01 LONG TERM CURRENT USE OF ANTICOAGULANT THERAPY: ICD-10-CM

## 2019-07-14 DIAGNOSIS — D68.59 PROTEIN S DEFICIENCY: ICD-10-CM

## 2019-07-14 RX ORDER — WARFARIN 7.5 MG/1
TABLET ORAL
Qty: 20 TABLET | Refills: 0 | OUTPATIENT
Start: 2019-07-14

## 2019-07-15 ENCOUNTER — PATIENT OUTREACH (OUTPATIENT)
Dept: ADMINISTRATIVE | Facility: HOSPITAL | Age: 59
End: 2019-07-15

## 2019-07-15 RX ORDER — WARFARIN 7.5 MG/1
TABLET ORAL
Qty: 45 TABLET | Refills: 4 | Status: SHIPPED | OUTPATIENT
Start: 2019-07-15 | End: 2020-01-31 | Stop reason: SDUPTHER

## 2019-07-29 ENCOUNTER — OFFICE VISIT (OUTPATIENT)
Dept: PRIMARY CARE CLINIC | Facility: CLINIC | Age: 59
End: 2019-07-29
Payer: COMMERCIAL

## 2019-07-29 ENCOUNTER — LAB VISIT (OUTPATIENT)
Dept: LAB | Facility: HOSPITAL | Age: 59
End: 2019-07-29
Attending: INTERNAL MEDICINE
Payer: COMMERCIAL

## 2019-07-29 VITALS
TEMPERATURE: 99 F | SYSTOLIC BLOOD PRESSURE: 117 MMHG | HEIGHT: 71 IN | HEART RATE: 46 BPM | BODY MASS INDEX: 22.41 KG/M2 | WEIGHT: 160.06 LBS | DIASTOLIC BLOOD PRESSURE: 72 MMHG

## 2019-07-29 DIAGNOSIS — Z79.01 LONG TERM (CURRENT) USE OF ANTICOAGULANTS: ICD-10-CM

## 2019-07-29 DIAGNOSIS — Z12.5 PROSTATE CANCER SCREENING: ICD-10-CM

## 2019-07-29 DIAGNOSIS — D68.59 PROTEIN S DEFICIENCY: Primary | ICD-10-CM

## 2019-07-29 DIAGNOSIS — Z23 NEED FOR SHINGLES VACCINE: ICD-10-CM

## 2019-07-29 LAB — COMPLEXED PSA SERPL-MCNC: 0.64 NG/ML (ref 0–4)

## 2019-07-29 PROCEDURE — 3008F PR BODY MASS INDEX (BMI) DOCUMENTED: ICD-10-PCS | Mod: CPTII,S$GLB,, | Performed by: INTERNAL MEDICINE

## 2019-07-29 PROCEDURE — 99999 PR PBB SHADOW E&M-EST. PATIENT-LVL III: CPT | Mod: PBBFAC,,, | Performed by: INTERNAL MEDICINE

## 2019-07-29 PROCEDURE — 84153 ASSAY OF PSA TOTAL: CPT

## 2019-07-29 PROCEDURE — 99214 OFFICE O/P EST MOD 30 MIN: CPT | Mod: S$GLB,,, | Performed by: INTERNAL MEDICINE

## 2019-07-29 PROCEDURE — 99214 PR OFFICE/OUTPT VISIT, EST, LEVL IV, 30-39 MIN: ICD-10-PCS | Mod: S$GLB,,, | Performed by: INTERNAL MEDICINE

## 2019-07-29 PROCEDURE — 36415 COLL VENOUS BLD VENIPUNCTURE: CPT | Mod: PN

## 2019-07-29 PROCEDURE — 3008F BODY MASS INDEX DOCD: CPT | Mod: CPTII,S$GLB,, | Performed by: INTERNAL MEDICINE

## 2019-07-29 PROCEDURE — 99999 PR PBB SHADOW E&M-EST. PATIENT-LVL III: ICD-10-PCS | Mod: PBBFAC,,, | Performed by: INTERNAL MEDICINE

## 2019-07-29 NOTE — PROGRESS NOTES
Subjective:       Patient ID: Jigar Saxena is a 59 y.o. male.    Chief Complaint: Establish Care    Last seen by previous PCP about four months ago for annual exam. Presents for transfer of care since that doctor has relocated. No acute complaints except mild right knee sprain recently, treated with Tylenol and ice compress.     PMH: Protein S deficiency (familial clotting disorder) on long term anticoagulation. INR 2.8 July 2, '19 [facility].   Eye exam yrs ago. Prostate screening yrs ago. Colonoscopy at age 50 - polyps were removed. Labs 3/19: CBC and CMP normal, TChol 196, TG 68, HDL 63, , iFOBT negative.   Flu shot 11/18. No Shingles vaccine yet. Td?    PSH: None.     Social: Non-smoker, alcohol two per week. , two daughters.     FMH: reviewed.     NKDA.    Medications: Coumadin, ASA and Multivitamin.     Review of Systems   Constitutional: Negative for activity change, appetite change, chills, fatigue, fever and unexpected weight change.   HENT: Negative for congestion, ear pain, hearing loss, postnasal drip, rhinorrhea, sore throat, trouble swallowing and voice change.    Eyes: Negative for pain and visual disturbance.   Respiratory: Negative for apnea, cough, chest tightness, shortness of breath and wheezing.    Cardiovascular: Negative for chest pain, palpitations and leg swelling.   Gastrointestinal: Negative for abdominal pain, blood in stool, constipation, diarrhea, nausea and vomiting.   Genitourinary: Negative for difficulty urinating, dysuria, frequency, hematuria, scrotal swelling and urgency.   Musculoskeletal: Negative for back pain, gait problem, myalgias, neck pain and neck stiffness.   Skin: Negative for color change and rash.   Neurological: Negative for dizziness, seizures, syncope, weakness, numbness and headaches.   Hematological: Negative for adenopathy. Does not bruise/bleed easily.   Psychiatric/Behavioral: Negative for agitation, dysphoric mood  "and sleep disturbance. The patient is not nervous/anxious.        Objective:    /72, Pulse 46, Ht 5' 11", Wt 160 lbs, BMI=22  Physical Exam   Constitutional: He is oriented to person, place, and time. He appears well-developed and well-nourished. No distress.   HENT:   Nose: Nose normal.   Mouth/Throat: Oropharynx is clear and moist.   Eyes: Conjunctivae and EOM are normal.   Cardiovascular: Regular rhythm and normal heart sounds.   Sinus nuvia, asymptomatic.    Pulmonary/Chest: Effort normal and breath sounds normal. No respiratory distress. He has no wheezes. He has no rales.   Musculoskeletal: Normal range of motion. He exhibits no edema.   Varicose veins left leg.   Neurological: He is alert and oriented to person, place, and time. No cranial nerve deficit. Coordination normal.   Skin: Skin is warm and dry. He is not diaphoretic.   Psychiatric: He has a normal mood and affect. His behavior is normal.       Assessment:       1. Protein S deficiency    2. Long term (current) use of anticoagulants    3. Prostate cancer screening    4. Need for shingles vaccine        Plan:       Protein S deficiency    Long term (current) use of anticoagulants - therapeutic, no bleeding complications.     Prostate cancer screening  -     PSA, Screening; Future; Expected date: 07/29/2019    Need for shingles vaccine - Shingrix from the pharmacy.     Eye exam recommended, he plans to go to an outside Optometrist.     Flu shot when available.     Return for annual exam 4/1/20.       "

## 2019-08-13 RX ORDER — WARFARIN 10 MG/1
TABLET ORAL
Qty: 54 TABLET | Refills: 2 | Status: SHIPPED | OUTPATIENT
Start: 2019-08-13 | End: 2020-01-31 | Stop reason: SDUPTHER

## 2019-08-27 ENCOUNTER — ANTI-COAG VISIT (OUTPATIENT)
Dept: CARDIOLOGY | Facility: CLINIC | Age: 59
End: 2019-08-27
Payer: COMMERCIAL

## 2019-08-27 DIAGNOSIS — Z79.01 LONG TERM (CURRENT) USE OF ANTICOAGULANTS: Primary | ICD-10-CM

## 2019-08-27 DIAGNOSIS — D68.59 PROTEIN S DEFICIENCY: ICD-10-CM

## 2019-08-27 LAB — INR PPP: 3.3 (ref 2–3)

## 2019-08-27 PROCEDURE — 93793 ANTICOAG MGMT PT WARFARIN: CPT | Mod: S$GLB,,,

## 2019-08-27 PROCEDURE — 85610 PROTHROMBIN TIME: CPT | Mod: QW,S$GLB,, | Performed by: INTERNAL MEDICINE

## 2019-08-27 PROCEDURE — 93793 PR ANTICOAGULANT MGMT FOR PT TAKING WARFARIN: ICD-10-PCS | Mod: S$GLB,,,

## 2019-08-27 PROCEDURE — 85610 POCT INR: ICD-10-PCS | Mod: QW,S$GLB,, | Performed by: INTERNAL MEDICINE

## 2019-08-27 NOTE — PROGRESS NOTES
INR a little high. Patient reports missing a dose 8/21 then taking extra on 8/22 & 8/23. Denies changes in meds, health, or diet. No signs or symptoms of bleeding. Dose is normally stable and possibly high from altering his dose. He will extra greens today to lower his INR. We will leave his dose as is. Recheck INR in 3 weeks

## 2019-09-17 ENCOUNTER — ANTI-COAG VISIT (OUTPATIENT)
Dept: CARDIOLOGY | Facility: CLINIC | Age: 59
End: 2019-09-17
Payer: COMMERCIAL

## 2019-09-17 DIAGNOSIS — D68.59 PROTEIN S DEFICIENCY: ICD-10-CM

## 2019-09-17 DIAGNOSIS — Z79.01 LONG TERM (CURRENT) USE OF ANTICOAGULANTS: Primary | ICD-10-CM

## 2019-09-17 LAB — INR PPP: 2.7 (ref 2–3)

## 2019-09-17 PROCEDURE — 93793 PR ANTICOAGULANT MGMT FOR PT TAKING WARFARIN: ICD-10-PCS | Mod: S$GLB,,,

## 2019-09-17 PROCEDURE — 85610 POCT INR: ICD-10-PCS | Mod: QW,S$GLB,, | Performed by: INTERNAL MEDICINE

## 2019-09-17 PROCEDURE — 85610 PROTHROMBIN TIME: CPT | Mod: QW,S$GLB,, | Performed by: INTERNAL MEDICINE

## 2019-09-17 PROCEDURE — 93793 ANTICOAG MGMT PT WARFARIN: CPT | Mod: S$GLB,,,

## 2019-09-17 NOTE — PROGRESS NOTES
INR back in range. No new changes. No signs or symptoms of bleeding. INR was slightly high last visit due to changes at that time. Patient has been back in usual routine. Dose has been very stable. Continue maintenance dose and resume routine INR

## 2019-10-29 ENCOUNTER — ANTI-COAG VISIT (OUTPATIENT)
Dept: CARDIOLOGY | Facility: CLINIC | Age: 59
End: 2019-10-29
Payer: COMMERCIAL

## 2019-10-29 DIAGNOSIS — D68.59 PROTEIN S DEFICIENCY: ICD-10-CM

## 2019-10-29 DIAGNOSIS — Z79.01 LONG TERM (CURRENT) USE OF ANTICOAGULANTS: Primary | ICD-10-CM

## 2019-10-29 LAB — INR PPP: 2.4 (ref 2–3)

## 2019-10-29 PROCEDURE — 85610 POCT INR: ICD-10-PCS | Mod: QW,S$GLB,, | Performed by: INTERNAL MEDICINE

## 2019-10-29 PROCEDURE — 93793 ANTICOAG MGMT PT WARFARIN: CPT | Mod: S$GLB,,,

## 2019-10-29 PROCEDURE — 85610 PROTHROMBIN TIME: CPT | Mod: QW,S$GLB,, | Performed by: INTERNAL MEDICINE

## 2019-10-29 PROCEDURE — 93793 PR ANTICOAGULANT MGMT FOR PT TAKING WARFARIN: ICD-10-PCS | Mod: S$GLB,,,

## 2019-12-10 ENCOUNTER — ANTI-COAG VISIT (OUTPATIENT)
Dept: CARDIOLOGY | Facility: CLINIC | Age: 59
End: 2019-12-10
Payer: COMMERCIAL

## 2019-12-10 DIAGNOSIS — Z79.01 LONG TERM (CURRENT) USE OF ANTICOAGULANTS: Primary | ICD-10-CM

## 2019-12-10 DIAGNOSIS — D68.59 PROTEIN S DEFICIENCY: ICD-10-CM

## 2019-12-10 LAB — INR PPP: 2.1 (ref 2–3)

## 2019-12-10 PROCEDURE — 93793 PR ANTICOAGULANT MGMT FOR PT TAKING WARFARIN: ICD-10-PCS | Mod: S$GLB,,,

## 2019-12-10 PROCEDURE — 85610 POCT INR: ICD-10-PCS | Mod: QW,S$GLB,, | Performed by: INTERNAL MEDICINE

## 2019-12-10 PROCEDURE — 85610 PROTHROMBIN TIME: CPT | Mod: QW,S$GLB,, | Performed by: INTERNAL MEDICINE

## 2019-12-10 PROCEDURE — 93793 ANTICOAG MGMT PT WARFARIN: CPT | Mod: S$GLB,,,

## 2020-01-31 ENCOUNTER — ANTI-COAG VISIT (OUTPATIENT)
Dept: CARDIOLOGY | Facility: CLINIC | Age: 60
End: 2020-01-31
Payer: COMMERCIAL

## 2020-01-31 DIAGNOSIS — D68.59 PROTEIN S DEFICIENCY: ICD-10-CM

## 2020-01-31 DIAGNOSIS — Z79.01 LONG TERM (CURRENT) USE OF ANTICOAGULANTS: Primary | ICD-10-CM

## 2020-01-31 DIAGNOSIS — Z79.01 LONG TERM CURRENT USE OF ANTICOAGULANT THERAPY: ICD-10-CM

## 2020-01-31 LAB — INR PPP: 2.4 (ref 2–3)

## 2020-01-31 PROCEDURE — 93793 ANTICOAG MGMT PT WARFARIN: CPT | Mod: S$GLB,,,

## 2020-01-31 PROCEDURE — 85610 PROTHROMBIN TIME: CPT | Mod: QW,S$GLB,, | Performed by: INTERNAL MEDICINE

## 2020-01-31 PROCEDURE — 93793 PR ANTICOAGULANT MGMT FOR PT TAKING WARFARIN: ICD-10-PCS | Mod: S$GLB,,,

## 2020-01-31 PROCEDURE — 85610 POCT INR: ICD-10-PCS | Mod: QW,S$GLB,, | Performed by: INTERNAL MEDICINE

## 2020-01-31 RX ORDER — WARFARIN 10 MG/1
10 TABLET ORAL DAILY
Qty: 20 TABLET | Refills: 11 | Status: SHIPPED | OUTPATIENT
Start: 2020-01-31 | End: 2021-02-10 | Stop reason: SDUPTHER

## 2020-01-31 RX ORDER — WARFARIN 7.5 MG/1
7.5 TABLET ORAL DAILY
Qty: 20 TABLET | Refills: 11 | Status: SHIPPED | OUTPATIENT
Start: 2020-01-31 | End: 2021-05-20

## 2020-01-31 NOTE — PROGRESS NOTES
INR good. No changes in medications, health, or diet. No signs or symptoms of bleeding. INR stable. Maintain current dose and repeat INR in 7 weeks.

## 2020-03-16 ENCOUNTER — ANTI-COAG VISIT (OUTPATIENT)
Dept: CARDIOLOGY | Facility: CLINIC | Age: 60
End: 2020-03-16
Payer: COMMERCIAL

## 2020-03-16 DIAGNOSIS — Z79.01 LONG TERM (CURRENT) USE OF ANTICOAGULANTS: Primary | ICD-10-CM

## 2020-03-16 DIAGNOSIS — D68.59 PROTEIN S DEFICIENCY: ICD-10-CM

## 2020-03-16 LAB — INR PPP: 2.6 (ref 2–3)

## 2020-03-16 PROCEDURE — 93793 ANTICOAG MGMT PT WARFARIN: CPT | Mod: S$GLB,,,

## 2020-03-16 PROCEDURE — 85610 PROTHROMBIN TIME: CPT | Mod: QW,S$GLB,, | Performed by: INTERNAL MEDICINE

## 2020-03-16 PROCEDURE — 85610 POCT INR: ICD-10-PCS | Mod: QW,S$GLB,, | Performed by: INTERNAL MEDICINE

## 2020-03-16 PROCEDURE — 93793 PR ANTICOAGULANT MGMT FOR PT TAKING WARFARIN: ICD-10-PCS | Mod: S$GLB,,,

## 2020-03-16 NOTE — PROGRESS NOTES
INR within goal range today. Patient with bruise to eye and leg from explained injuries - healing well. Denies any bleeding or other changes. Patient is stable on this dose. He will continue this dose until follow-up in 12 weeks (per CHEST and spacing more 2/2 Covid). I advised him to contact us with any changes or problems.

## 2020-04-06 NOTE — PROGRESS NOTES
Patient was called and notified coumadin clinic is closed and his 5/25 appointment needed to be rescheduled to the 5th floor lab department at [facility], verbalized understanding

## 2020-05-07 ENCOUNTER — OFFICE VISIT (OUTPATIENT)
Dept: PRIMARY CARE CLINIC | Facility: CLINIC | Age: 60
End: 2020-05-07
Payer: COMMERCIAL

## 2020-05-07 VITALS
HEIGHT: 71 IN | TEMPERATURE: 98 F | SYSTOLIC BLOOD PRESSURE: 108 MMHG | DIASTOLIC BLOOD PRESSURE: 80 MMHG | HEART RATE: 52 BPM | WEIGHT: 164.88 LBS | BODY MASS INDEX: 23.08 KG/M2

## 2020-05-07 DIAGNOSIS — L25.9 CONTACT DERMATITIS, UNSPECIFIED CONTACT DERMATITIS TYPE, UNSPECIFIED TRIGGER: Primary | ICD-10-CM

## 2020-05-07 PROCEDURE — 3008F BODY MASS INDEX DOCD: CPT | Mod: CPTII,S$GLB,, | Performed by: INTERNAL MEDICINE

## 2020-05-07 PROCEDURE — 99213 PR OFFICE/OUTPT VISIT, EST, LEVL III, 20-29 MIN: ICD-10-PCS | Mod: S$GLB,,, | Performed by: INTERNAL MEDICINE

## 2020-05-07 PROCEDURE — 99213 OFFICE O/P EST LOW 20 MIN: CPT | Mod: S$GLB,,, | Performed by: INTERNAL MEDICINE

## 2020-05-07 PROCEDURE — 99999 PR PBB SHADOW E&M-EST. PATIENT-LVL III: CPT | Mod: PBBFAC,,, | Performed by: INTERNAL MEDICINE

## 2020-05-07 PROCEDURE — 99999 PR PBB SHADOW E&M-EST. PATIENT-LVL III: ICD-10-PCS | Mod: PBBFAC,,, | Performed by: INTERNAL MEDICINE

## 2020-05-07 PROCEDURE — 3008F PR BODY MASS INDEX (BMI) DOCUMENTED: ICD-10-PCS | Mod: CPTII,S$GLB,, | Performed by: INTERNAL MEDICINE

## 2020-05-07 RX ORDER — TRIAMCINOLONE ACETONIDE 1 MG/G
CREAM TOPICAL 2 TIMES DAILY
Qty: 45 G | Refills: 0 | Status: SHIPPED | OUTPATIENT
Start: 2020-05-07 | End: 2021-06-29

## 2020-05-07 NOTE — PROGRESS NOTES
Subjective:       Patient ID: Jigar Saxena is a 60 y.o. male.    Chief Complaint: Rash (right arm)    Here for an urgent visit c/o rash to right arm. Onset one week ago after petting a feral cat that seemed to have an oily substance on its fur. It began that same day with a small area of inflammation on ventral forearm that gradually spread to a large plaque covering all of antecubital fossa. It is mildly pruritic, never painful. No blisters, open sores, swelling or drainage. Unlike previous poison ivy rashes he's had in the past, and he hasn't been gardening. No other new exposures including no new medications, no change in personal hygiene products. Possible mild food allergy to shrimp - he hasn't eaten any shellfish. Managed it with topical Benadryl and topical Cortisone with some improvement, it is not as inflamed as it had been, and is no longer spreading. No associated fever, chills, body aches, facial swelling, limb swelling, SOB. No contact with anyone else with a rash.     PMH: Protein S deficiency (familial clotting disorder) on long term anticoagulation [facility].   Eye exam yrs ago. Prostate screening yrs ago. Colonoscopy at age 50 - polyps were removed. Labs 3/19: CBC and CMP normal, TChol 196, TG 68, HDL 63, , iFOBT negative.   Flu shot 2019. No Shingles vaccine yet. Td?    PSH: None.     Social: Non-smoker, alcohol two per week. , two daughters.     FMH: reviewed.     NKDA.    Medications: Coumadin, ASA and Multivitamin.     Rash   This is a new problem. The current episode started in the past 7 days. The problem has been gradually improving since onset. The affected locations include theright arm. The rash is characterized by redness and itchiness. It is unknown if there was an exposure to a precipitant. Pertinent negatives include no anorexia, congestion, cough, diarrhea, eye pain, facial edema, fatigue, fever, joint pain, nail changes, rhinorrhea,  shortness of breath, sore throat or vomiting. Past treatments include anti-itch cream and antihistamine. The treatment provided mild relief. His past medical history is significant for allergies and varicella. There is no history of asthma or eczema.     Review of Systems   Constitutional: Negative for fatigue and fever.   HENT: Negative for congestion, facial swelling, rhinorrhea and sore throat.    Eyes: Negative for pain.   Respiratory: Negative for cough, chest tightness, shortness of breath and wheezing.    Gastrointestinal: Negative for anorexia, diarrhea and vomiting.   Musculoskeletal: Negative for arthralgias, joint pain and myalgias.   Skin: Positive for rash. Negative for nail changes and wound.       Objective:    Afebrile, VSS.   Physical Exam   Constitutional: He is oriented to person, place, and time. He appears well-nourished. No distress.   HENT:   Nose: Nose normal.   Mouth/Throat: Oropharynx is clear and moist.   Eyes: Conjunctivae are normal. Right eye exhibits no discharge. Left eye exhibits no discharge.   Pulmonary/Chest: Effort normal. No respiratory distress.   Neurological: He is alert and oriented to person, place, and time.   Skin: Skin is warm and dry.   Mild to moderate erythema of skin in right antecubital fossa and ventral forearm with no vesicles, ulceration, induration, fluctuance or drainage/weeping.        Assessment:       1. Contact dermatitis, unspecified contact dermatitis type, unspecified trigger        Plan:       Contact dermatitis, unspecified contact dermatitis type, unspecified trigger - no evidence of bacterial, viral or fungal infection.   -     triamcinolone acetonide 0.1% (KENALOG) 0.1 % cream; Apply topically 2 (two) times daily.  Dispense: 45 g; Refill: 0  -     Keep clean and dry, stay cool to avoid irritation. Anticipate complete resolution within two weeks, can not rule out recurrence.

## 2020-05-25 ENCOUNTER — ANTI-COAG VISIT (OUTPATIENT)
Dept: CARDIOLOGY | Facility: CLINIC | Age: 60
End: 2020-05-25
Payer: COMMERCIAL

## 2020-05-25 ENCOUNTER — LAB VISIT (OUTPATIENT)
Dept: LAB | Facility: HOSPITAL | Age: 60
End: 2020-05-25
Attending: INTERNAL MEDICINE
Payer: COMMERCIAL

## 2020-05-25 DIAGNOSIS — D68.59 PROTEIN S DEFICIENCY: ICD-10-CM

## 2020-05-25 DIAGNOSIS — Z79.01 LONG TERM (CURRENT) USE OF ANTICOAGULANTS: ICD-10-CM

## 2020-05-25 LAB
INR PPP: 2.7 (ref 0.8–1.2)
PROTHROMBIN TIME: 25.4 SEC (ref 9–12.5)

## 2020-05-25 PROCEDURE — 36415 COLL VENOUS BLD VENIPUNCTURE: CPT | Mod: PO

## 2020-05-25 PROCEDURE — 93793 PR ANTICOAGULANT MGMT FOR PT TAKING WARFARIN: ICD-10-PCS | Mod: S$GLB,,, | Performed by: PHARMACIST

## 2020-05-25 PROCEDURE — 85610 PROTHROMBIN TIME: CPT

## 2020-05-25 PROCEDURE — 93793 ANTICOAG MGMT PT WARFARIN: CPT | Mod: S$GLB,,, | Performed by: PHARMACIST

## 2020-07-13 ENCOUNTER — LAB VISIT (OUTPATIENT)
Dept: LAB | Facility: HOSPITAL | Age: 60
End: 2020-07-13
Attending: INTERNAL MEDICINE
Payer: COMMERCIAL

## 2020-07-13 DIAGNOSIS — Z79.01 LONG TERM (CURRENT) USE OF ANTICOAGULANTS: ICD-10-CM

## 2020-07-13 DIAGNOSIS — D68.59 PROTEIN S DEFICIENCY: ICD-10-CM

## 2020-07-13 LAB
INR PPP: 2.7 (ref 0.8–1.2)
PROTHROMBIN TIME: 25.3 SEC (ref 9–12.5)

## 2020-07-13 PROCEDURE — 36415 COLL VENOUS BLD VENIPUNCTURE: CPT | Mod: PO

## 2020-07-13 PROCEDURE — 85610 PROTHROMBIN TIME: CPT

## 2020-07-14 ENCOUNTER — ANTI-COAG VISIT (OUTPATIENT)
Dept: CARDIOLOGY | Facility: CLINIC | Age: 60
End: 2020-07-14
Payer: COMMERCIAL

## 2020-07-14 DIAGNOSIS — D68.59 PROTEIN S DEFICIENCY: ICD-10-CM

## 2020-07-14 DIAGNOSIS — Z79.01 LONG TERM (CURRENT) USE OF ANTICOAGULANTS: ICD-10-CM

## 2020-07-14 PROCEDURE — 93793 ANTICOAG MGMT PT WARFARIN: CPT | Mod: S$GLB,,, | Performed by: PHARMACIST

## 2020-07-14 PROCEDURE — 93793 PR ANTICOAGULANT MGMT FOR PT TAKING WARFARIN: ICD-10-PCS | Mod: S$GLB,,, | Performed by: PHARMACIST

## 2020-08-31 ENCOUNTER — LAB VISIT (OUTPATIENT)
Dept: LAB | Facility: HOSPITAL | Age: 60
End: 2020-08-31
Attending: INTERNAL MEDICINE
Payer: COMMERCIAL

## 2020-08-31 DIAGNOSIS — D68.59 PROTEIN S DEFICIENCY: ICD-10-CM

## 2020-08-31 DIAGNOSIS — Z79.01 LONG TERM (CURRENT) USE OF ANTICOAGULANTS: ICD-10-CM

## 2020-08-31 LAB
INR PPP: 2.2 (ref 0.8–1.2)
PROTHROMBIN TIME: 23 SEC (ref 9–12.5)

## 2020-08-31 PROCEDURE — 85610 PROTHROMBIN TIME: CPT

## 2020-08-31 PROCEDURE — 36415 COLL VENOUS BLD VENIPUNCTURE: CPT | Mod: PN

## 2020-09-01 ENCOUNTER — ANTI-COAG VISIT (OUTPATIENT)
Dept: CARDIOLOGY | Facility: CLINIC | Age: 60
End: 2020-09-01
Payer: COMMERCIAL

## 2020-09-01 DIAGNOSIS — Z79.01 LONG TERM (CURRENT) USE OF ANTICOAGULANTS: ICD-10-CM

## 2020-09-01 DIAGNOSIS — D68.59 PROTEIN S DEFICIENCY: ICD-10-CM

## 2020-09-01 PROCEDURE — 93793 PR ANTICOAGULANT MGMT FOR PT TAKING WARFARIN: ICD-10-PCS | Mod: S$GLB,,, | Performed by: PHARMACIST

## 2020-09-01 PROCEDURE — 93793 ANTICOAG MGMT PT WARFARIN: CPT | Mod: S$GLB,,, | Performed by: PHARMACIST

## 2020-10-20 ENCOUNTER — ANTI-COAG VISIT (OUTPATIENT)
Dept: CARDIOLOGY | Facility: CLINIC | Age: 60
End: 2020-10-20
Payer: COMMERCIAL

## 2020-10-20 ENCOUNTER — LAB VISIT (OUTPATIENT)
Dept: LAB | Facility: HOSPITAL | Age: 60
End: 2020-10-20
Attending: INTERNAL MEDICINE
Payer: COMMERCIAL

## 2020-10-20 DIAGNOSIS — D68.59 PROTEIN S DEFICIENCY: ICD-10-CM

## 2020-10-20 DIAGNOSIS — Z79.01 LONG TERM (CURRENT) USE OF ANTICOAGULANTS: ICD-10-CM

## 2020-10-20 LAB
INR PPP: 2.8 (ref 0.8–1.2)
PROTHROMBIN TIME: 29.1 SEC (ref 9–12.5)

## 2020-10-20 PROCEDURE — 85610 PROTHROMBIN TIME: CPT

## 2020-10-20 PROCEDURE — 93793 PR ANTICOAGULANT MGMT FOR PT TAKING WARFARIN: ICD-10-PCS | Mod: S$GLB,,, | Performed by: PHARMACIST

## 2020-10-20 PROCEDURE — 36415 COLL VENOUS BLD VENIPUNCTURE: CPT | Mod: PN

## 2020-10-20 PROCEDURE — 93793 ANTICOAG MGMT PT WARFARIN: CPT | Mod: S$GLB,,, | Performed by: PHARMACIST

## 2020-10-22 NOTE — PROGRESS NOTES
Patient called and was given lab result, verified coumadin dosage correctly, reports no changes, Patient was advised to continue same dose of coumadin and given next lab date, verbalized understanding, appointment booked

## 2020-10-30 ENCOUNTER — PATIENT MESSAGE (OUTPATIENT)
Dept: ADMINISTRATIVE | Facility: HOSPITAL | Age: 60
End: 2020-10-30

## 2020-12-04 DIAGNOSIS — Z12.11 COLON CANCER SCREENING: ICD-10-CM

## 2020-12-08 ENCOUNTER — LAB VISIT (OUTPATIENT)
Dept: LAB | Facility: HOSPITAL | Age: 60
End: 2020-12-08
Attending: INTERNAL MEDICINE
Payer: COMMERCIAL

## 2020-12-08 DIAGNOSIS — Z79.01 LONG TERM (CURRENT) USE OF ANTICOAGULANTS: ICD-10-CM

## 2020-12-08 DIAGNOSIS — D68.59 PROTEIN S DEFICIENCY: ICD-10-CM

## 2020-12-08 LAB
INR PPP: 1.9 (ref 0.8–1.2)
PROTHROMBIN TIME: 20.3 SEC (ref 9–12.5)

## 2020-12-08 PROCEDURE — 85610 PROTHROMBIN TIME: CPT

## 2020-12-08 PROCEDURE — 36415 COLL VENOUS BLD VENIPUNCTURE: CPT | Mod: PN

## 2020-12-09 ENCOUNTER — ANTI-COAG VISIT (OUTPATIENT)
Dept: CARDIOLOGY | Facility: CLINIC | Age: 60
End: 2020-12-09
Payer: COMMERCIAL

## 2020-12-09 DIAGNOSIS — Z79.01 LONG TERM (CURRENT) USE OF ANTICOAGULANTS: ICD-10-CM

## 2020-12-09 DIAGNOSIS — D68.59 PROTEIN S DEFICIENCY: ICD-10-CM

## 2020-12-09 PROCEDURE — 93793 PR ANTICOAGULANT MGMT FOR PT TAKING WARFARIN: ICD-10-PCS | Mod: S$GLB,,, | Performed by: PHARMACIST

## 2020-12-09 PROCEDURE — 93793 ANTICOAG MGMT PT WARFARIN: CPT | Mod: S$GLB,,, | Performed by: PHARMACIST

## 2020-12-29 ENCOUNTER — ANTI-COAG VISIT (OUTPATIENT)
Dept: CARDIOLOGY | Facility: CLINIC | Age: 60
End: 2020-12-29
Payer: COMMERCIAL

## 2020-12-29 ENCOUNTER — LAB VISIT (OUTPATIENT)
Dept: LAB | Facility: HOSPITAL | Age: 60
End: 2020-12-29
Attending: INTERNAL MEDICINE
Payer: COMMERCIAL

## 2020-12-29 ENCOUNTER — PATIENT MESSAGE (OUTPATIENT)
Dept: CARDIOLOGY | Facility: CLINIC | Age: 60
End: 2020-12-29

## 2020-12-29 DIAGNOSIS — Z79.01 LONG TERM (CURRENT) USE OF ANTICOAGULANTS: ICD-10-CM

## 2020-12-29 DIAGNOSIS — D68.59 PROTEIN S DEFICIENCY: ICD-10-CM

## 2020-12-29 LAB
INR PPP: 1.9 (ref 0.8–1.2)
PROTHROMBIN TIME: 20 SEC (ref 9–12.5)

## 2020-12-29 PROCEDURE — 36415 COLL VENOUS BLD VENIPUNCTURE: CPT | Mod: PN

## 2020-12-29 PROCEDURE — 93793 ANTICOAG MGMT PT WARFARIN: CPT | Mod: S$GLB,,, | Performed by: PHARMACIST

## 2020-12-29 PROCEDURE — 93793 PR ANTICOAGULANT MGMT FOR PT TAKING WARFARIN: ICD-10-PCS | Mod: S$GLB,,, | Performed by: PHARMACIST

## 2020-12-29 PROCEDURE — 85610 PROTHROMBIN TIME: CPT

## 2021-01-04 ENCOUNTER — PATIENT MESSAGE (OUTPATIENT)
Dept: ADMINISTRATIVE | Facility: HOSPITAL | Age: 61
End: 2021-01-04

## 2021-01-13 ENCOUNTER — ANTI-COAG VISIT (OUTPATIENT)
Dept: CARDIOLOGY | Facility: CLINIC | Age: 61
End: 2021-01-13
Payer: COMMERCIAL

## 2021-01-13 ENCOUNTER — LAB VISIT (OUTPATIENT)
Dept: LAB | Facility: HOSPITAL | Age: 61
End: 2021-01-13
Attending: INTERNAL MEDICINE
Payer: COMMERCIAL

## 2021-01-13 DIAGNOSIS — D68.59 PROTEIN S DEFICIENCY: ICD-10-CM

## 2021-01-13 DIAGNOSIS — Z79.01 LONG TERM (CURRENT) USE OF ANTICOAGULANTS: ICD-10-CM

## 2021-01-13 LAB
INR PPP: 2.1 (ref 0.8–1.2)
PROTHROMBIN TIME: 21.9 SEC (ref 9–12.5)

## 2021-01-13 PROCEDURE — 93793 PR ANTICOAGULANT MGMT FOR PT TAKING WARFARIN: ICD-10-PCS | Mod: S$GLB,,,

## 2021-01-13 PROCEDURE — 36415 COLL VENOUS BLD VENIPUNCTURE: CPT | Mod: PN

## 2021-01-13 PROCEDURE — 93793 ANTICOAG MGMT PT WARFARIN: CPT | Mod: S$GLB,,,

## 2021-01-13 PROCEDURE — 85610 PROTHROMBIN TIME: CPT

## 2021-01-27 ENCOUNTER — ANTI-COAG VISIT (OUTPATIENT)
Dept: CARDIOLOGY | Facility: CLINIC | Age: 61
End: 2021-01-27
Payer: COMMERCIAL

## 2021-01-27 ENCOUNTER — LAB VISIT (OUTPATIENT)
Dept: LAB | Facility: HOSPITAL | Age: 61
End: 2021-01-27
Attending: INTERNAL MEDICINE
Payer: COMMERCIAL

## 2021-01-27 DIAGNOSIS — Z79.01 LONG TERM (CURRENT) USE OF ANTICOAGULANTS: ICD-10-CM

## 2021-01-27 DIAGNOSIS — D68.59 PROTEIN S DEFICIENCY: ICD-10-CM

## 2021-01-27 DIAGNOSIS — Z79.01 LONG TERM (CURRENT) USE OF ANTICOAGULANTS: Primary | ICD-10-CM

## 2021-01-27 LAB
INR PPP: 1.9 (ref 0.8–1.2)
PROTHROMBIN TIME: 20.3 SEC (ref 9–12.5)

## 2021-01-27 PROCEDURE — 36415 COLL VENOUS BLD VENIPUNCTURE: CPT | Mod: PN

## 2021-01-27 PROCEDURE — 93793 PR ANTICOAGULANT MGMT FOR PT TAKING WARFARIN: ICD-10-PCS | Mod: S$GLB,,, | Performed by: PHARMACIST

## 2021-01-27 PROCEDURE — 93793 ANTICOAG MGMT PT WARFARIN: CPT | Mod: S$GLB,,, | Performed by: PHARMACIST

## 2021-01-27 PROCEDURE — 85610 PROTHROMBIN TIME: CPT

## 2021-02-10 ENCOUNTER — ANTI-COAG VISIT (OUTPATIENT)
Dept: CARDIOLOGY | Facility: CLINIC | Age: 61
End: 2021-02-10
Payer: COMMERCIAL

## 2021-02-10 ENCOUNTER — LAB VISIT (OUTPATIENT)
Dept: LAB | Facility: HOSPITAL | Age: 61
End: 2021-02-10
Attending: INTERNAL MEDICINE
Payer: COMMERCIAL

## 2021-02-10 DIAGNOSIS — D68.59 PROTEIN S DEFICIENCY: ICD-10-CM

## 2021-02-10 DIAGNOSIS — Z79.01 LONG TERM (CURRENT) USE OF ANTICOAGULANTS: Primary | ICD-10-CM

## 2021-02-10 DIAGNOSIS — Z79.01 LONG TERM (CURRENT) USE OF ANTICOAGULANTS: ICD-10-CM

## 2021-02-10 LAB
INR PPP: 1.9 (ref 0.8–1.2)
PROTHROMBIN TIME: 19.8 SEC (ref 9–12.5)

## 2021-02-10 PROCEDURE — 36415 COLL VENOUS BLD VENIPUNCTURE: CPT | Mod: PN

## 2021-02-10 PROCEDURE — 93793 PR ANTICOAGULANT MGMT FOR PT TAKING WARFARIN: ICD-10-PCS | Mod: S$GLB,,, | Performed by: PHARMACIST

## 2021-02-10 PROCEDURE — 85610 PROTHROMBIN TIME: CPT

## 2021-02-10 PROCEDURE — 93793 ANTICOAG MGMT PT WARFARIN: CPT | Mod: S$GLB,,, | Performed by: PHARMACIST

## 2021-02-10 RX ORDER — WARFARIN 10 MG/1
10 TABLET ORAL DAILY
Qty: 35 TABLET | Refills: 11 | Status: SHIPPED | OUTPATIENT
Start: 2021-02-10 | End: 2022-02-22 | Stop reason: SDUPTHER

## 2021-02-24 ENCOUNTER — LAB VISIT (OUTPATIENT)
Dept: LAB | Facility: HOSPITAL | Age: 61
End: 2021-02-24
Attending: INTERNAL MEDICINE
Payer: COMMERCIAL

## 2021-02-24 DIAGNOSIS — Z79.01 LONG TERM (CURRENT) USE OF ANTICOAGULANTS: ICD-10-CM

## 2021-02-24 DIAGNOSIS — D68.59 PROTEIN S DEFICIENCY: ICD-10-CM

## 2021-02-24 PROCEDURE — 85610 PROTHROMBIN TIME: CPT

## 2021-02-24 PROCEDURE — 36415 COLL VENOUS BLD VENIPUNCTURE: CPT | Mod: PN

## 2021-02-25 ENCOUNTER — ANTI-COAG VISIT (OUTPATIENT)
Dept: CARDIOLOGY | Facility: CLINIC | Age: 61
End: 2021-02-25
Payer: COMMERCIAL

## 2021-02-25 DIAGNOSIS — Z79.01 LONG TERM (CURRENT) USE OF ANTICOAGULANTS: Primary | ICD-10-CM

## 2021-02-25 DIAGNOSIS — D68.59 PROTEIN S DEFICIENCY: ICD-10-CM

## 2021-02-25 LAB
INR PPP: 2.5 (ref 0.8–1.2)
PROTHROMBIN TIME: 26.2 SEC (ref 9–12.5)

## 2021-02-25 PROCEDURE — 93793 ANTICOAG MGMT PT WARFARIN: CPT | Mod: S$GLB,,, | Performed by: PHARMACIST

## 2021-02-25 PROCEDURE — 93793 PR ANTICOAGULANT MGMT FOR PT TAKING WARFARIN: ICD-10-PCS | Mod: S$GLB,,, | Performed by: PHARMACIST

## 2021-03-17 ENCOUNTER — LAB VISIT (OUTPATIENT)
Dept: LAB | Facility: HOSPITAL | Age: 61
End: 2021-03-17
Attending: INTERNAL MEDICINE
Payer: COMMERCIAL

## 2021-03-17 ENCOUNTER — ANTI-COAG VISIT (OUTPATIENT)
Dept: CARDIOLOGY | Facility: CLINIC | Age: 61
End: 2021-03-17
Payer: COMMERCIAL

## 2021-03-17 DIAGNOSIS — D68.59 PROTEIN S DEFICIENCY: ICD-10-CM

## 2021-03-17 DIAGNOSIS — Z79.01 LONG TERM (CURRENT) USE OF ANTICOAGULANTS: Primary | ICD-10-CM

## 2021-03-17 DIAGNOSIS — Z79.01 LONG TERM (CURRENT) USE OF ANTICOAGULANTS: ICD-10-CM

## 2021-03-17 LAB
INR PPP: 2.5 (ref 0.8–1.2)
PROTHROMBIN TIME: 25.6 SEC (ref 9–12.5)

## 2021-03-17 PROCEDURE — 85610 PROTHROMBIN TIME: CPT | Performed by: INTERNAL MEDICINE

## 2021-03-17 PROCEDURE — 93793 PR ANTICOAGULANT MGMT FOR PT TAKING WARFARIN: ICD-10-PCS | Mod: S$GLB,,, | Performed by: PHARMACIST

## 2021-03-17 PROCEDURE — 93793 ANTICOAG MGMT PT WARFARIN: CPT | Mod: S$GLB,,, | Performed by: PHARMACIST

## 2021-03-17 PROCEDURE — 36415 COLL VENOUS BLD VENIPUNCTURE: CPT | Mod: PN | Performed by: INTERNAL MEDICINE

## 2021-04-05 ENCOUNTER — PATIENT MESSAGE (OUTPATIENT)
Dept: ADMINISTRATIVE | Facility: HOSPITAL | Age: 61
End: 2021-04-05

## 2021-04-14 ENCOUNTER — LAB VISIT (OUTPATIENT)
Dept: LAB | Facility: HOSPITAL | Age: 61
End: 2021-04-14
Attending: INTERNAL MEDICINE
Payer: COMMERCIAL

## 2021-04-14 ENCOUNTER — ANTI-COAG VISIT (OUTPATIENT)
Dept: CARDIOLOGY | Facility: CLINIC | Age: 61
End: 2021-04-14
Payer: COMMERCIAL

## 2021-04-14 DIAGNOSIS — Z79.01 LONG TERM (CURRENT) USE OF ANTICOAGULANTS: Primary | ICD-10-CM

## 2021-04-14 DIAGNOSIS — Z79.01 LONG TERM (CURRENT) USE OF ANTICOAGULANTS: ICD-10-CM

## 2021-04-14 DIAGNOSIS — D68.59 PROTEIN S DEFICIENCY: ICD-10-CM

## 2021-04-14 LAB
INR PPP: 2.8 (ref 0.8–1.2)
PROTHROMBIN TIME: 28.5 SEC (ref 9–12.5)

## 2021-04-14 PROCEDURE — 93793 PR ANTICOAGULANT MGMT FOR PT TAKING WARFARIN: ICD-10-PCS | Mod: S$GLB,,, | Performed by: PHARMACIST

## 2021-04-14 PROCEDURE — 93793 ANTICOAG MGMT PT WARFARIN: CPT | Mod: S$GLB,,, | Performed by: PHARMACIST

## 2021-04-14 PROCEDURE — 36415 COLL VENOUS BLD VENIPUNCTURE: CPT | Mod: PN | Performed by: INTERNAL MEDICINE

## 2021-04-14 PROCEDURE — 85610 PROTHROMBIN TIME: CPT | Performed by: INTERNAL MEDICINE

## 2021-05-12 ENCOUNTER — ANTI-COAG VISIT (OUTPATIENT)
Dept: CARDIOLOGY | Facility: CLINIC | Age: 61
End: 2021-05-12
Payer: COMMERCIAL

## 2021-05-12 ENCOUNTER — TELEPHONE (OUTPATIENT)
Dept: PRIMARY CARE CLINIC | Facility: CLINIC | Age: 61
End: 2021-05-12

## 2021-05-12 ENCOUNTER — LAB VISIT (OUTPATIENT)
Dept: LAB | Facility: HOSPITAL | Age: 61
End: 2021-05-12
Attending: INTERNAL MEDICINE
Payer: COMMERCIAL

## 2021-05-12 DIAGNOSIS — Z79.01 LONG TERM (CURRENT) USE OF ANTICOAGULANTS: ICD-10-CM

## 2021-05-12 DIAGNOSIS — D68.59 PROTEIN S DEFICIENCY: ICD-10-CM

## 2021-05-12 DIAGNOSIS — Z79.01 LONG TERM (CURRENT) USE OF ANTICOAGULANTS: Primary | ICD-10-CM

## 2021-05-12 LAB
INR PPP: 3.1 (ref 0.8–1.2)
PROTHROMBIN TIME: 31.2 SEC (ref 9–12.5)

## 2021-05-12 PROCEDURE — 93793 ANTICOAG MGMT PT WARFARIN: CPT | Mod: S$GLB,,, | Performed by: PHARMACIST

## 2021-05-12 PROCEDURE — 93793 PR ANTICOAGULANT MGMT FOR PT TAKING WARFARIN: ICD-10-PCS | Mod: S$GLB,,, | Performed by: PHARMACIST

## 2021-05-12 PROCEDURE — 36415 COLL VENOUS BLD VENIPUNCTURE: CPT | Mod: PN | Performed by: INTERNAL MEDICINE

## 2021-05-12 PROCEDURE — 85610 PROTHROMBIN TIME: CPT | Performed by: INTERNAL MEDICINE

## 2021-06-02 ENCOUNTER — LAB VISIT (OUTPATIENT)
Dept: LAB | Facility: HOSPITAL | Age: 61
End: 2021-06-02
Attending: INTERNAL MEDICINE
Payer: COMMERCIAL

## 2021-06-02 ENCOUNTER — ANTI-COAG VISIT (OUTPATIENT)
Dept: CARDIOLOGY | Facility: CLINIC | Age: 61
End: 2021-06-02
Payer: COMMERCIAL

## 2021-06-02 DIAGNOSIS — D68.59 PROTEIN S DEFICIENCY: ICD-10-CM

## 2021-06-02 DIAGNOSIS — Z79.01 LONG TERM (CURRENT) USE OF ANTICOAGULANTS: ICD-10-CM

## 2021-06-02 DIAGNOSIS — Z79.01 LONG TERM (CURRENT) USE OF ANTICOAGULANTS: Primary | ICD-10-CM

## 2021-06-02 LAB
INR PPP: 2.7 (ref 0.8–1.2)
PROTHROMBIN TIME: 27.9 SEC (ref 9–12.5)

## 2021-06-02 PROCEDURE — 93793 ANTICOAG MGMT PT WARFARIN: CPT | Mod: S$GLB,,, | Performed by: PHARMACIST

## 2021-06-02 PROCEDURE — 36415 COLL VENOUS BLD VENIPUNCTURE: CPT | Mod: PN | Performed by: INTERNAL MEDICINE

## 2021-06-02 PROCEDURE — 85610 PROTHROMBIN TIME: CPT | Performed by: INTERNAL MEDICINE

## 2021-06-02 PROCEDURE — 93793 PR ANTICOAGULANT MGMT FOR PT TAKING WARFARIN: ICD-10-PCS | Mod: S$GLB,,, | Performed by: PHARMACIST

## 2021-06-23 ENCOUNTER — ANTI-COAG VISIT (OUTPATIENT)
Dept: CARDIOLOGY | Facility: CLINIC | Age: 61
End: 2021-06-23
Payer: COMMERCIAL

## 2021-06-23 ENCOUNTER — LAB VISIT (OUTPATIENT)
Dept: LAB | Facility: HOSPITAL | Age: 61
End: 2021-06-23
Attending: INTERNAL MEDICINE
Payer: COMMERCIAL

## 2021-06-23 DIAGNOSIS — D68.59 PROTEIN S DEFICIENCY: ICD-10-CM

## 2021-06-23 DIAGNOSIS — Z79.01 LONG TERM (CURRENT) USE OF ANTICOAGULANTS: ICD-10-CM

## 2021-06-23 DIAGNOSIS — Z79.01 LONG TERM (CURRENT) USE OF ANTICOAGULANTS: Primary | ICD-10-CM

## 2021-06-23 LAB
INR PPP: 3.4 (ref 0.8–1.2)
PROTHROMBIN TIME: 34.7 SEC (ref 9–12.5)

## 2021-06-23 PROCEDURE — 93793 PR ANTICOAGULANT MGMT FOR PT TAKING WARFARIN: ICD-10-PCS | Mod: S$GLB,,, | Performed by: PHARMACIST

## 2021-06-23 PROCEDURE — 93793 ANTICOAG MGMT PT WARFARIN: CPT | Mod: S$GLB,,, | Performed by: PHARMACIST

## 2021-06-23 PROCEDURE — 85610 PROTHROMBIN TIME: CPT | Performed by: INTERNAL MEDICINE

## 2021-06-23 PROCEDURE — 36415 COLL VENOUS BLD VENIPUNCTURE: CPT | Mod: PN | Performed by: INTERNAL MEDICINE

## 2021-06-29 ENCOUNTER — LAB VISIT (OUTPATIENT)
Dept: LAB | Facility: HOSPITAL | Age: 61
End: 2021-06-29
Attending: INTERNAL MEDICINE
Payer: COMMERCIAL

## 2021-06-29 ENCOUNTER — PATIENT MESSAGE (OUTPATIENT)
Dept: PHARMACY | Facility: CLINIC | Age: 61
End: 2021-06-29

## 2021-06-29 ENCOUNTER — OFFICE VISIT (OUTPATIENT)
Dept: PRIMARY CARE CLINIC | Facility: CLINIC | Age: 61
End: 2021-06-29
Payer: COMMERCIAL

## 2021-06-29 VITALS
DIASTOLIC BLOOD PRESSURE: 74 MMHG | RESPIRATION RATE: 18 BRPM | OXYGEN SATURATION: 98 % | SYSTOLIC BLOOD PRESSURE: 118 MMHG | TEMPERATURE: 98 F | WEIGHT: 158.06 LBS | HEIGHT: 71 IN | HEART RATE: 50 BPM | BODY MASS INDEX: 22.13 KG/M2

## 2021-06-29 DIAGNOSIS — Z12.5 PROSTATE CANCER SCREENING: ICD-10-CM

## 2021-06-29 DIAGNOSIS — Z23 NEED FOR SHINGLES VACCINE: ICD-10-CM

## 2021-06-29 DIAGNOSIS — Z00.00 ROUTINE MEDICAL EXAM: ICD-10-CM

## 2021-06-29 DIAGNOSIS — Z79.01 LONG TERM (CURRENT) USE OF ANTICOAGULANTS: ICD-10-CM

## 2021-06-29 DIAGNOSIS — Z12.11 COLON CANCER SCREENING: ICD-10-CM

## 2021-06-29 DIAGNOSIS — Z11.4 SCREENING FOR HIV (HUMAN IMMUNODEFICIENCY VIRUS): ICD-10-CM

## 2021-06-29 DIAGNOSIS — Z00.00 ROUTINE MEDICAL EXAM: Primary | ICD-10-CM

## 2021-06-29 DIAGNOSIS — D68.59 PROTEIN S DEFICIENCY: ICD-10-CM

## 2021-06-29 LAB
ALBUMIN SERPL BCP-MCNC: 3.8 G/DL (ref 3.5–5.2)
ALP SERPL-CCNC: 55 U/L (ref 55–135)
ALT SERPL W/O P-5'-P-CCNC: 25 U/L (ref 10–44)
ANION GAP SERPL CALC-SCNC: 8 MMOL/L (ref 8–16)
AST SERPL-CCNC: 28 U/L (ref 10–40)
BILIRUB SERPL-MCNC: 0.6 MG/DL (ref 0.1–1)
BILIRUB UR QL STRIP: NEGATIVE
BUN SERPL-MCNC: 14 MG/DL (ref 8–23)
CALCIUM SERPL-MCNC: 9.3 MG/DL (ref 8.7–10.5)
CHLORIDE SERPL-SCNC: 108 MMOL/L (ref 95–110)
CHOLEST SERPL-MCNC: 191 MG/DL (ref 120–199)
CHOLEST/HDLC SERPL: 2.7 {RATIO} (ref 2–5)
CLARITY UR REFRACT.AUTO: ABNORMAL
CO2 SERPL-SCNC: 27 MMOL/L (ref 23–29)
COLOR UR AUTO: YELLOW
COMPLEXED PSA SERPL-MCNC: 0.86 NG/ML (ref 0–4)
CREAT SERPL-MCNC: 1 MG/DL (ref 0.5–1.4)
ERYTHROCYTE [DISTWIDTH] IN BLOOD BY AUTOMATED COUNT: 13.6 % (ref 11.5–14.5)
EST. GFR  (AFRICAN AMERICAN): >60 ML/MIN/1.73 M^2
EST. GFR  (NON AFRICAN AMERICAN): >60 ML/MIN/1.73 M^2
GLUCOSE SERPL-MCNC: 98 MG/DL (ref 70–110)
GLUCOSE UR QL STRIP: NEGATIVE
HCT VFR BLD AUTO: 41.9 % (ref 40–54)
HDLC SERPL-MCNC: 70 MG/DL (ref 40–75)
HDLC SERPL: 36.6 % (ref 20–50)
HGB BLD-MCNC: 13.4 G/DL (ref 14–18)
HGB UR QL STRIP: ABNORMAL
HIV 1+2 AB+HIV1 P24 AG SERPL QL IA: NEGATIVE
KETONES UR QL STRIP: NEGATIVE
LDLC SERPL CALC-MCNC: 108 MG/DL (ref 63–159)
LEUKOCYTE ESTERASE UR QL STRIP: NEGATIVE
MCH RBC QN AUTO: 31 PG (ref 27–31)
MCHC RBC AUTO-ENTMCNC: 32 G/DL (ref 32–36)
MCV RBC AUTO: 97 FL (ref 82–98)
MICROSCOPIC COMMENT: ABNORMAL
NITRITE UR QL STRIP: NEGATIVE
NONHDLC SERPL-MCNC: 121 MG/DL
PH UR STRIP: 6 [PH] (ref 5–8)
PLATELET # BLD AUTO: 194 K/UL (ref 150–450)
PMV BLD AUTO: 11.8 FL (ref 9.2–12.9)
POTASSIUM SERPL-SCNC: 4.9 MMOL/L (ref 3.5–5.1)
PROT SERPL-MCNC: 6.3 G/DL (ref 6–8.4)
PROT UR QL STRIP: NEGATIVE
RBC # BLD AUTO: 4.32 M/UL (ref 4.6–6.2)
RBC #/AREA URNS AUTO: 11 /HPF (ref 0–4)
SODIUM SERPL-SCNC: 143 MMOL/L (ref 136–145)
SP GR UR STRIP: 1.02 (ref 1–1.03)
SQUAMOUS #/AREA URNS AUTO: 0 /HPF
TRIGL SERPL-MCNC: 65 MG/DL (ref 30–150)
URN SPEC COLLECT METH UR: ABNORMAL
WBC # BLD AUTO: 4.64 K/UL (ref 3.9–12.7)
WBC #/AREA URNS AUTO: 0 /HPF (ref 0–5)

## 2021-06-29 PROCEDURE — 86703 HIV-1/HIV-2 1 RESULT ANTBDY: CPT | Performed by: INTERNAL MEDICINE

## 2021-06-29 PROCEDURE — 81001 URINALYSIS AUTO W/SCOPE: CPT | Performed by: INTERNAL MEDICINE

## 2021-06-29 PROCEDURE — 3008F PR BODY MASS INDEX (BMI) DOCUMENTED: ICD-10-PCS | Mod: CPTII,S$GLB,, | Performed by: INTERNAL MEDICINE

## 2021-06-29 PROCEDURE — 85027 COMPLETE CBC AUTOMATED: CPT | Performed by: INTERNAL MEDICINE

## 2021-06-29 PROCEDURE — 36415 COLL VENOUS BLD VENIPUNCTURE: CPT | Mod: PN | Performed by: INTERNAL MEDICINE

## 2021-06-29 PROCEDURE — 99396 PREV VISIT EST AGE 40-64: CPT | Mod: S$GLB,,, | Performed by: INTERNAL MEDICINE

## 2021-06-29 PROCEDURE — 80053 COMPREHEN METABOLIC PANEL: CPT | Performed by: INTERNAL MEDICINE

## 2021-06-29 PROCEDURE — 3008F BODY MASS INDEX DOCD: CPT | Mod: CPTII,S$GLB,, | Performed by: INTERNAL MEDICINE

## 2021-06-29 PROCEDURE — 99999 PR PBB SHADOW E&M-EST. PATIENT-LVL IV: CPT | Mod: PBBFAC,,, | Performed by: INTERNAL MEDICINE

## 2021-06-29 PROCEDURE — 99396 PR PREVENTIVE VISIT,EST,40-64: ICD-10-PCS | Mod: S$GLB,,, | Performed by: INTERNAL MEDICINE

## 2021-06-29 PROCEDURE — 1125F AMNT PAIN NOTED PAIN PRSNT: CPT | Mod: S$GLB,,, | Performed by: INTERNAL MEDICINE

## 2021-06-29 PROCEDURE — 84153 ASSAY OF PSA TOTAL: CPT | Performed by: INTERNAL MEDICINE

## 2021-06-29 PROCEDURE — 99999 PR PBB SHADOW E&M-EST. PATIENT-LVL IV: ICD-10-PCS | Mod: PBBFAC,,, | Performed by: INTERNAL MEDICINE

## 2021-06-29 PROCEDURE — 80061 LIPID PANEL: CPT | Performed by: INTERNAL MEDICINE

## 2021-06-29 PROCEDURE — 1125F PR PAIN SEVERITY QUANTIFIED, PAIN PRESENT: ICD-10-PCS | Mod: S$GLB,,, | Performed by: INTERNAL MEDICINE

## 2021-07-06 ENCOUNTER — PATIENT MESSAGE (OUTPATIENT)
Dept: ADMINISTRATIVE | Facility: HOSPITAL | Age: 61
End: 2021-07-06

## 2021-07-07 ENCOUNTER — LAB VISIT (OUTPATIENT)
Dept: LAB | Facility: HOSPITAL | Age: 61
End: 2021-07-07
Attending: INTERNAL MEDICINE
Payer: COMMERCIAL

## 2021-07-07 ENCOUNTER — ANTI-COAG VISIT (OUTPATIENT)
Dept: CARDIOLOGY | Facility: CLINIC | Age: 61
End: 2021-07-07
Payer: COMMERCIAL

## 2021-07-07 DIAGNOSIS — Z79.01 LONG TERM (CURRENT) USE OF ANTICOAGULANTS: Primary | ICD-10-CM

## 2021-07-07 DIAGNOSIS — D68.59 PROTEIN S DEFICIENCY: ICD-10-CM

## 2021-07-07 DIAGNOSIS — Z79.01 LONG TERM (CURRENT) USE OF ANTICOAGULANTS: ICD-10-CM

## 2021-07-07 LAB
INR PPP: 3.2 (ref 0.8–1.2)
PROTHROMBIN TIME: 32.3 SEC (ref 9–12.5)

## 2021-07-07 PROCEDURE — 36415 COLL VENOUS BLD VENIPUNCTURE: CPT | Mod: PN | Performed by: INTERNAL MEDICINE

## 2021-07-07 PROCEDURE — 93793 ANTICOAG MGMT PT WARFARIN: CPT | Mod: S$GLB,,, | Performed by: PHARMACIST

## 2021-07-07 PROCEDURE — 93793 PR ANTICOAGULANT MGMT FOR PT TAKING WARFARIN: ICD-10-PCS | Mod: S$GLB,,, | Performed by: PHARMACIST

## 2021-07-07 PROCEDURE — 85610 PROTHROMBIN TIME: CPT | Performed by: INTERNAL MEDICINE

## 2021-07-14 ENCOUNTER — ANTI-COAG VISIT (OUTPATIENT)
Dept: CARDIOLOGY | Facility: CLINIC | Age: 61
End: 2021-07-14
Payer: COMMERCIAL

## 2021-07-14 ENCOUNTER — LAB VISIT (OUTPATIENT)
Dept: LAB | Facility: HOSPITAL | Age: 61
End: 2021-07-14
Payer: COMMERCIAL

## 2021-07-14 DIAGNOSIS — Z79.01 LONG TERM (CURRENT) USE OF ANTICOAGULANTS: Primary | ICD-10-CM

## 2021-07-14 DIAGNOSIS — Z79.01 LONG TERM (CURRENT) USE OF ANTICOAGULANTS: ICD-10-CM

## 2021-07-14 DIAGNOSIS — D68.59 PROTEIN S DEFICIENCY: ICD-10-CM

## 2021-07-14 LAB
INR PPP: 2.5 (ref 0.8–1.2)
PROTHROMBIN TIME: 25.5 SEC (ref 9–12.5)

## 2021-07-14 PROCEDURE — 36415 COLL VENOUS BLD VENIPUNCTURE: CPT | Mod: PN | Performed by: INTERNAL MEDICINE

## 2021-07-14 PROCEDURE — 85610 PROTHROMBIN TIME: CPT | Performed by: INTERNAL MEDICINE

## 2021-07-14 PROCEDURE — 93793 PR ANTICOAGULANT MGMT FOR PT TAKING WARFARIN: ICD-10-PCS | Mod: S$GLB,,, | Performed by: PHARMACIST

## 2021-07-14 PROCEDURE — 93793 ANTICOAG MGMT PT WARFARIN: CPT | Mod: S$GLB,,, | Performed by: PHARMACIST

## 2021-07-28 ENCOUNTER — ANTI-COAG VISIT (OUTPATIENT)
Dept: CARDIOLOGY | Facility: CLINIC | Age: 61
End: 2021-07-28
Payer: COMMERCIAL

## 2021-07-28 ENCOUNTER — LAB VISIT (OUTPATIENT)
Dept: LAB | Facility: HOSPITAL | Age: 61
End: 2021-07-28
Attending: INTERNAL MEDICINE
Payer: COMMERCIAL

## 2021-07-28 DIAGNOSIS — Z79.01 LONG TERM (CURRENT) USE OF ANTICOAGULANTS: ICD-10-CM

## 2021-07-28 DIAGNOSIS — D68.59 PROTEIN S DEFICIENCY: ICD-10-CM

## 2021-07-28 DIAGNOSIS — Z79.01 LONG TERM (CURRENT) USE OF ANTICOAGULANTS: Primary | ICD-10-CM

## 2021-07-28 LAB
INR PPP: 2.5 (ref 0.8–1.2)
PROTHROMBIN TIME: 25.7 SEC (ref 9–12.5)

## 2021-07-28 PROCEDURE — 93793 PR ANTICOAGULANT MGMT FOR PT TAKING WARFARIN: ICD-10-PCS | Mod: S$GLB,,,

## 2021-07-28 PROCEDURE — 85610 PROTHROMBIN TIME: CPT | Performed by: INTERNAL MEDICINE

## 2021-07-28 PROCEDURE — 93793 ANTICOAG MGMT PT WARFARIN: CPT | Mod: S$GLB,,,

## 2021-07-28 PROCEDURE — 36415 COLL VENOUS BLD VENIPUNCTURE: CPT | Mod: PN | Performed by: INTERNAL MEDICINE

## 2021-08-23 ENCOUNTER — LAB VISIT (OUTPATIENT)
Dept: LAB | Facility: HOSPITAL | Age: 61
End: 2021-08-23
Attending: INTERNAL MEDICINE
Payer: COMMERCIAL

## 2021-08-23 ENCOUNTER — ANTI-COAG VISIT (OUTPATIENT)
Dept: CARDIOLOGY | Facility: CLINIC | Age: 61
End: 2021-08-23
Payer: COMMERCIAL

## 2021-08-23 DIAGNOSIS — Z79.01 LONG TERM (CURRENT) USE OF ANTICOAGULANTS: ICD-10-CM

## 2021-08-23 DIAGNOSIS — Z79.01 LONG TERM (CURRENT) USE OF ANTICOAGULANTS: Primary | ICD-10-CM

## 2021-08-23 DIAGNOSIS — D68.59 PROTEIN S DEFICIENCY: ICD-10-CM

## 2021-08-23 LAB
INR PPP: 3.1 (ref 0.8–1.2)
PROTHROMBIN TIME: 31.5 SEC (ref 9–12.5)

## 2021-08-23 PROCEDURE — 93793 ANTICOAG MGMT PT WARFARIN: CPT | Mod: S$GLB,,, | Performed by: PHARMACIST

## 2021-08-23 PROCEDURE — 85610 PROTHROMBIN TIME: CPT | Performed by: INTERNAL MEDICINE

## 2021-08-23 PROCEDURE — 93793 PR ANTICOAGULANT MGMT FOR PT TAKING WARFARIN: ICD-10-PCS | Mod: S$GLB,,, | Performed by: PHARMACIST

## 2021-08-23 PROCEDURE — 36415 COLL VENOUS BLD VENIPUNCTURE: CPT | Mod: PN | Performed by: INTERNAL MEDICINE

## 2021-09-21 ENCOUNTER — LAB VISIT (OUTPATIENT)
Dept: LAB | Facility: HOSPITAL | Age: 61
End: 2021-09-21
Attending: INTERNAL MEDICINE
Payer: COMMERCIAL

## 2021-09-21 ENCOUNTER — ANTI-COAG VISIT (OUTPATIENT)
Dept: CARDIOLOGY | Facility: CLINIC | Age: 61
End: 2021-09-21
Payer: COMMERCIAL

## 2021-09-21 DIAGNOSIS — D68.59 PROTEIN S DEFICIENCY: ICD-10-CM

## 2021-09-21 DIAGNOSIS — Z79.01 LONG TERM (CURRENT) USE OF ANTICOAGULANTS: ICD-10-CM

## 2021-09-21 DIAGNOSIS — Z79.01 LONG TERM (CURRENT) USE OF ANTICOAGULANTS: Primary | ICD-10-CM

## 2021-09-21 LAB
INR PPP: 3.3 (ref 0.8–1.2)
PROTHROMBIN TIME: 33.1 SEC (ref 9–12.5)

## 2021-09-21 PROCEDURE — 93793 ANTICOAG MGMT PT WARFARIN: CPT | Mod: S$GLB,,, | Performed by: PHARMACIST

## 2021-09-21 PROCEDURE — 93793 PR ANTICOAGULANT MGMT FOR PT TAKING WARFARIN: ICD-10-PCS | Mod: S$GLB,,, | Performed by: PHARMACIST

## 2021-09-21 PROCEDURE — 85610 PROTHROMBIN TIME: CPT | Performed by: INTERNAL MEDICINE

## 2021-09-21 PROCEDURE — 36415 COLL VENOUS BLD VENIPUNCTURE: CPT | Mod: PN | Performed by: INTERNAL MEDICINE

## 2021-10-05 ENCOUNTER — ANTI-COAG VISIT (OUTPATIENT)
Dept: CARDIOLOGY | Facility: CLINIC | Age: 61
End: 2021-10-05
Payer: COMMERCIAL

## 2021-10-05 ENCOUNTER — PATIENT MESSAGE (OUTPATIENT)
Dept: ADMINISTRATIVE | Facility: HOSPITAL | Age: 61
End: 2021-10-05

## 2021-10-05 ENCOUNTER — LAB VISIT (OUTPATIENT)
Dept: LAB | Facility: HOSPITAL | Age: 61
End: 2021-10-05
Attending: INTERNAL MEDICINE
Payer: COMMERCIAL

## 2021-10-05 DIAGNOSIS — D68.59 PROTEIN S DEFICIENCY: ICD-10-CM

## 2021-10-05 DIAGNOSIS — Z79.01 LONG TERM (CURRENT) USE OF ANTICOAGULANTS: ICD-10-CM

## 2021-10-05 DIAGNOSIS — Z79.01 LONG TERM (CURRENT) USE OF ANTICOAGULANTS: Primary | ICD-10-CM

## 2021-10-05 LAB
INR PPP: 3 (ref 0.8–1.2)
PROTHROMBIN TIME: 30.8 SEC (ref 9–12.5)

## 2021-10-05 PROCEDURE — 93793 ANTICOAG MGMT PT WARFARIN: CPT | Mod: S$GLB,,, | Performed by: PHARMACIST

## 2021-10-05 PROCEDURE — 36415 COLL VENOUS BLD VENIPUNCTURE: CPT | Mod: PN | Performed by: INTERNAL MEDICINE

## 2021-10-05 PROCEDURE — 85610 PROTHROMBIN TIME: CPT | Performed by: INTERNAL MEDICINE

## 2021-10-05 PROCEDURE — 93793 PR ANTICOAGULANT MGMT FOR PT TAKING WARFARIN: ICD-10-PCS | Mod: S$GLB,,, | Performed by: PHARMACIST

## 2021-10-19 ENCOUNTER — LAB VISIT (OUTPATIENT)
Dept: LAB | Facility: HOSPITAL | Age: 61
End: 2021-10-19
Attending: INTERNAL MEDICINE
Payer: COMMERCIAL

## 2021-10-19 ENCOUNTER — PATIENT MESSAGE (OUTPATIENT)
Dept: ADMINISTRATIVE | Facility: HOSPITAL | Age: 61
End: 2021-10-19
Payer: COMMERCIAL

## 2021-10-19 ENCOUNTER — ANTI-COAG VISIT (OUTPATIENT)
Dept: CARDIOLOGY | Facility: CLINIC | Age: 61
End: 2021-10-19
Payer: COMMERCIAL

## 2021-10-19 DIAGNOSIS — Z79.01 LONG TERM (CURRENT) USE OF ANTICOAGULANTS: Primary | ICD-10-CM

## 2021-10-19 DIAGNOSIS — D68.59 PROTEIN S DEFICIENCY: ICD-10-CM

## 2021-10-19 DIAGNOSIS — Z79.01 LONG TERM (CURRENT) USE OF ANTICOAGULANTS: ICD-10-CM

## 2021-10-19 LAB
INR PPP: 2.8 (ref 0.8–1.2)
PROTHROMBIN TIME: 29.2 SEC (ref 9–12.5)

## 2021-10-19 PROCEDURE — 93793 ANTICOAG MGMT PT WARFARIN: CPT | Mod: S$GLB,,, | Performed by: PHARMACIST

## 2021-10-19 PROCEDURE — 36415 COLL VENOUS BLD VENIPUNCTURE: CPT | Mod: PN | Performed by: INTERNAL MEDICINE

## 2021-10-19 PROCEDURE — 85610 PROTHROMBIN TIME: CPT | Performed by: INTERNAL MEDICINE

## 2021-10-19 PROCEDURE — 93793 PR ANTICOAGULANT MGMT FOR PT TAKING WARFARIN: ICD-10-PCS | Mod: S$GLB,,, | Performed by: PHARMACIST

## 2021-11-09 ENCOUNTER — ANTI-COAG VISIT (OUTPATIENT)
Dept: CARDIOLOGY | Facility: CLINIC | Age: 61
End: 2021-11-09
Payer: COMMERCIAL

## 2021-11-09 ENCOUNTER — LAB VISIT (OUTPATIENT)
Dept: LAB | Facility: HOSPITAL | Age: 61
End: 2021-11-09
Attending: INTERNAL MEDICINE
Payer: COMMERCIAL

## 2021-11-09 DIAGNOSIS — Z79.01 LONG TERM (CURRENT) USE OF ANTICOAGULANTS: Primary | ICD-10-CM

## 2021-11-09 DIAGNOSIS — D68.59 PROTEIN S DEFICIENCY: ICD-10-CM

## 2021-11-09 DIAGNOSIS — Z79.01 LONG TERM (CURRENT) USE OF ANTICOAGULANTS: ICD-10-CM

## 2021-11-09 LAB
INR PPP: 2.9 (ref 0.8–1.2)
PROTHROMBIN TIME: 29.4 SEC (ref 9–12.5)

## 2021-11-09 PROCEDURE — 93793 PR ANTICOAGULANT MGMT FOR PT TAKING WARFARIN: ICD-10-PCS | Mod: S$GLB,,, | Performed by: PHARMACIST

## 2021-11-09 PROCEDURE — 36415 COLL VENOUS BLD VENIPUNCTURE: CPT | Mod: PN | Performed by: INTERNAL MEDICINE

## 2021-11-09 PROCEDURE — 93793 ANTICOAG MGMT PT WARFARIN: CPT | Mod: S$GLB,,, | Performed by: PHARMACIST

## 2021-11-09 PROCEDURE — 85610 PROTHROMBIN TIME: CPT | Performed by: INTERNAL MEDICINE

## 2021-11-12 ENCOUNTER — IMMUNIZATION (OUTPATIENT)
Dept: PHARMACY | Facility: CLINIC | Age: 61
End: 2021-11-12
Payer: COMMERCIAL

## 2021-11-12 DIAGNOSIS — Z23 NEED FOR VACCINATION: Primary | ICD-10-CM

## 2021-11-15 ENCOUNTER — LAB VISIT (OUTPATIENT)
Dept: LAB | Facility: HOSPITAL | Age: 61
End: 2021-11-15
Attending: INTERNAL MEDICINE
Payer: COMMERCIAL

## 2021-11-15 DIAGNOSIS — Z12.11 COLON CANCER SCREENING: ICD-10-CM

## 2021-11-15 PROCEDURE — 82274 ASSAY TEST FOR BLOOD FECAL: CPT | Performed by: INTERNAL MEDICINE

## 2021-11-18 LAB — HEMOCCULT STL QL IA: NEGATIVE

## 2021-12-07 ENCOUNTER — LAB VISIT (OUTPATIENT)
Dept: LAB | Facility: HOSPITAL | Age: 61
End: 2021-12-07
Attending: INTERNAL MEDICINE
Payer: COMMERCIAL

## 2021-12-07 ENCOUNTER — ANTI-COAG VISIT (OUTPATIENT)
Dept: CARDIOLOGY | Facility: CLINIC | Age: 61
End: 2021-12-07
Payer: COMMERCIAL

## 2021-12-07 DIAGNOSIS — Z79.01 LONG TERM (CURRENT) USE OF ANTICOAGULANTS: Primary | ICD-10-CM

## 2021-12-07 DIAGNOSIS — Z79.01 LONG TERM (CURRENT) USE OF ANTICOAGULANTS: ICD-10-CM

## 2021-12-07 DIAGNOSIS — D68.59 PROTEIN S DEFICIENCY: ICD-10-CM

## 2021-12-07 LAB
INR PPP: 2.4 (ref 0.8–1.2)
PROTHROMBIN TIME: 24.6 SEC (ref 9–12.5)

## 2021-12-07 PROCEDURE — 36415 COLL VENOUS BLD VENIPUNCTURE: CPT | Mod: PN | Performed by: INTERNAL MEDICINE

## 2021-12-07 PROCEDURE — 93793 ANTICOAG MGMT PT WARFARIN: CPT | Mod: S$GLB,,, | Performed by: PHARMACIST

## 2021-12-07 PROCEDURE — 85610 PROTHROMBIN TIME: CPT | Performed by: INTERNAL MEDICINE

## 2021-12-07 PROCEDURE — 93793 PR ANTICOAGULANT MGMT FOR PT TAKING WARFARIN: ICD-10-PCS | Mod: S$GLB,,, | Performed by: PHARMACIST

## 2022-01-04 ENCOUNTER — ANTI-COAG VISIT (OUTPATIENT)
Dept: CARDIOLOGY | Facility: CLINIC | Age: 62
End: 2022-01-04
Payer: COMMERCIAL

## 2022-01-04 ENCOUNTER — LAB VISIT (OUTPATIENT)
Dept: LAB | Facility: HOSPITAL | Age: 62
End: 2022-01-04
Attending: INTERNAL MEDICINE
Payer: COMMERCIAL

## 2022-01-04 DIAGNOSIS — Z79.01 LONG TERM (CURRENT) USE OF ANTICOAGULANTS: ICD-10-CM

## 2022-01-04 DIAGNOSIS — D68.59 PROTEIN S DEFICIENCY: ICD-10-CM

## 2022-01-04 DIAGNOSIS — Z79.01 LONG TERM (CURRENT) USE OF ANTICOAGULANTS: Primary | ICD-10-CM

## 2022-01-04 LAB
INR PPP: 2.1 (ref 0.8–1.2)
PROTHROMBIN TIME: 22.1 SEC (ref 9–12.5)

## 2022-01-04 PROCEDURE — 85610 PROTHROMBIN TIME: CPT | Performed by: INTERNAL MEDICINE

## 2022-01-04 PROCEDURE — 93793 ANTICOAG MGMT PT WARFARIN: CPT | Mod: S$GLB,,, | Performed by: PHARMACIST

## 2022-01-04 PROCEDURE — 36415 COLL VENOUS BLD VENIPUNCTURE: CPT | Mod: PN | Performed by: INTERNAL MEDICINE

## 2022-01-04 PROCEDURE — 93793 PR ANTICOAGULANT MGMT FOR PT TAKING WARFARIN: ICD-10-PCS | Mod: S$GLB,,, | Performed by: PHARMACIST

## 2022-02-01 ENCOUNTER — LAB VISIT (OUTPATIENT)
Dept: LAB | Facility: HOSPITAL | Age: 62
End: 2022-02-01
Attending: INTERNAL MEDICINE
Payer: COMMERCIAL

## 2022-02-01 ENCOUNTER — ANTI-COAG VISIT (OUTPATIENT)
Dept: CARDIOLOGY | Facility: CLINIC | Age: 62
End: 2022-02-01
Payer: COMMERCIAL

## 2022-02-01 DIAGNOSIS — D68.59 PROTEIN S DEFICIENCY: ICD-10-CM

## 2022-02-01 DIAGNOSIS — Z79.01 LONG TERM (CURRENT) USE OF ANTICOAGULANTS: ICD-10-CM

## 2022-02-01 DIAGNOSIS — Z79.01 LONG TERM (CURRENT) USE OF ANTICOAGULANTS: Primary | ICD-10-CM

## 2022-02-01 LAB
INR PPP: 2.2 (ref 0.8–1.2)
PROTHROMBIN TIME: 22.9 SEC (ref 9–12.5)

## 2022-02-01 PROCEDURE — 36415 COLL VENOUS BLD VENIPUNCTURE: CPT | Mod: PN | Performed by: INTERNAL MEDICINE

## 2022-02-01 PROCEDURE — 93793 ANTICOAG MGMT PT WARFARIN: CPT | Mod: S$GLB,,, | Performed by: PHARMACIST

## 2022-02-01 PROCEDURE — 85610 PROTHROMBIN TIME: CPT | Performed by: INTERNAL MEDICINE

## 2022-02-01 PROCEDURE — 93793 PR ANTICOAGULANT MGMT FOR PT TAKING WARFARIN: ICD-10-PCS | Mod: S$GLB,,, | Performed by: PHARMACIST

## 2022-03-08 ENCOUNTER — LAB VISIT (OUTPATIENT)
Dept: LAB | Facility: HOSPITAL | Age: 62
End: 2022-03-08
Attending: INTERNAL MEDICINE
Payer: COMMERCIAL

## 2022-03-08 ENCOUNTER — ANTI-COAG VISIT (OUTPATIENT)
Dept: CARDIOLOGY | Facility: CLINIC | Age: 62
End: 2022-03-08
Payer: COMMERCIAL

## 2022-03-08 DIAGNOSIS — Z79.01 LONG TERM (CURRENT) USE OF ANTICOAGULANTS: ICD-10-CM

## 2022-03-08 DIAGNOSIS — D68.59 PROTEIN S DEFICIENCY: ICD-10-CM

## 2022-03-08 DIAGNOSIS — Z79.01 LONG TERM (CURRENT) USE OF ANTICOAGULANTS: Primary | ICD-10-CM

## 2022-03-08 LAB
INR PPP: 2 (ref 0.8–1.2)
PROTHROMBIN TIME: 20.4 SEC (ref 9–12.5)

## 2022-03-08 PROCEDURE — 93793 PR ANTICOAGULANT MGMT FOR PT TAKING WARFARIN: ICD-10-PCS | Mod: S$GLB,,, | Performed by: PHARMACIST

## 2022-03-08 PROCEDURE — 36415 COLL VENOUS BLD VENIPUNCTURE: CPT | Mod: PN | Performed by: INTERNAL MEDICINE

## 2022-03-08 PROCEDURE — 93793 ANTICOAG MGMT PT WARFARIN: CPT | Mod: S$GLB,,, | Performed by: PHARMACIST

## 2022-03-08 PROCEDURE — 85610 PROTHROMBIN TIME: CPT | Performed by: INTERNAL MEDICINE

## 2022-03-29 ENCOUNTER — OFFICE VISIT (OUTPATIENT)
Dept: INTERNAL MEDICINE | Facility: CLINIC | Age: 62
End: 2022-03-29
Payer: COMMERCIAL

## 2022-03-29 DIAGNOSIS — B35.9 TINEA: Primary | ICD-10-CM

## 2022-03-29 DIAGNOSIS — D68.59 PROTEIN S DEFICIENCY: ICD-10-CM

## 2022-03-29 PROCEDURE — 1160F PR REVIEW ALL MEDS BY PRESCRIBER/CLIN PHARMACIST DOCUMENTED: ICD-10-PCS | Mod: CPTII,95,, | Performed by: INTERNAL MEDICINE

## 2022-03-29 PROCEDURE — 1160F RVW MEDS BY RX/DR IN RCRD: CPT | Mod: CPTII,95,, | Performed by: INTERNAL MEDICINE

## 2022-03-29 PROCEDURE — 99213 PR OFFICE/OUTPT VISIT, EST, LEVL III, 20-29 MIN: ICD-10-PCS | Mod: 95,,, | Performed by: INTERNAL MEDICINE

## 2022-03-29 PROCEDURE — 1159F PR MEDICATION LIST DOCUMENTED IN MEDICAL RECORD: ICD-10-PCS | Mod: CPTII,95,, | Performed by: INTERNAL MEDICINE

## 2022-03-29 PROCEDURE — 99213 OFFICE O/P EST LOW 20 MIN: CPT | Mod: 95,,, | Performed by: INTERNAL MEDICINE

## 2022-03-29 PROCEDURE — 1159F MED LIST DOCD IN RCRD: CPT | Mod: CPTII,95,, | Performed by: INTERNAL MEDICINE

## 2022-03-29 RX ORDER — KETOCONAZOLE 20 MG/G
CREAM TOPICAL DAILY
Qty: 60 G | Refills: 0 | Status: SHIPPED | OUTPATIENT
Start: 2022-03-29 | End: 2022-06-30 | Stop reason: ALTCHOICE

## 2022-03-29 RX ORDER — TERBINAFINE HYDROCHLORIDE 250 MG/1
250 TABLET ORAL DAILY
Qty: 10 TABLET | Refills: 0 | Status: SHIPPED | OUTPATIENT
Start: 2022-03-29 | End: 2022-04-12 | Stop reason: SDUPTHER

## 2022-03-29 NOTE — PROGRESS NOTES
Subjective:       Patient ID: Jigar Saxena is a 62 y.o. male.    Chief Complaint: Tinea      The patient location is: Home, [place]  The chief complaint leading to consultation is: Rash, ringworm    Visit type: audiovisual    Face to Face time with patient: 14 minutes  17 minutes of total time spent on the encounter, which includes face to face time and non-face to face time preparing to see the patient (eg, review of tests), Obtaining and/or reviewing separately obtained history, Documenting clinical information in the electronic or other health record, Independently interpreting results (not separately reported) and communicating results to the patient/family/caregiver, or Care coordination (not separately reported).         Each patient to whom he or she provides medical services by telemedicine is:  (1) informed of the relationship between the physician and patient and the respective role of any other health care provider with respect to management of the patient; and (2) notified that he or she may decline to receive medical services by telemedicine and may withdraw from such care at any time.    Notes:     HPI: Pt complains of rash, mostly on legs. Started 5 days ago and around that time, they rescued a stray kitten 1 week before symptoms started.     Slightly itchy and he has tried clotrimazole OTC.   Wife saw her Derm for same rash and was given Ketoconazole cream and Lamisil pills. Derm said hers is Ringworm so he thinks his is the same thing. Rash is on his legs and forearms. Round, somewhat red. Difficult to determine scaling from the video.     Rash  This is a new problem. The current episode started in the past 7 days. The affected locations include the head, neck, left elbow and right leg. The rash is characterized by blistering, redness and itchiness. He was exposed to a new animal. Pertinent negatives include no fever. Past treatments include anti-itch cream. The treatment provided mild relief. His  past medical history is significant for varicella. There is no history of allergies, asthma or eczema.     Review of Systems   Constitutional: Negative for fever.   Integumentary:  Positive for rash.         Objective:      Physical Exam  Constitutional:       Appearance: Normal appearance.   Skin:     Findings: Rash (legs, wrist areas) present.   Neurological:      Mental Status: He is alert.         Assessment:       Problem List Items Addressed This Visit        Hematology    Protein S deficiency      Other Visit Diagnoses     Tinea    -  Primary          Plan:       Jigar was seen today for tinea.    Diagnoses and all orders for this visit:    Tinea    Protein S deficiency    Other orders  -     terbinafine HCL (LAMISIL) 250 mg tablet; Take 1 tablet (250 mg total) by mouth once daily.  -     ketoconazole (NIZORAL) 2 % cream; Apply topically once daily.         I explained to the pt he should notify coumadin clinic about starting a new med. I also explained that if not quickly improving may need an in person visit or Derm visit as it is sometimes hard to  a skin rash over video. He expressed understanding.   Take med with food.   Cat has been to the vet and was treated too.

## 2022-04-12 ENCOUNTER — LAB VISIT (OUTPATIENT)
Dept: LAB | Facility: HOSPITAL | Age: 62
End: 2022-04-12
Attending: INTERNAL MEDICINE
Payer: COMMERCIAL

## 2022-04-12 ENCOUNTER — OFFICE VISIT (OUTPATIENT)
Dept: PRIMARY CARE CLINIC | Facility: CLINIC | Age: 62
End: 2022-04-12
Payer: COMMERCIAL

## 2022-04-12 ENCOUNTER — ANTI-COAG VISIT (OUTPATIENT)
Dept: CARDIOLOGY | Facility: CLINIC | Age: 62
End: 2022-04-12
Payer: COMMERCIAL

## 2022-04-12 VITALS
DIASTOLIC BLOOD PRESSURE: 70 MMHG | RESPIRATION RATE: 17 BRPM | WEIGHT: 164 LBS | SYSTOLIC BLOOD PRESSURE: 112 MMHG | HEART RATE: 86 BPM | TEMPERATURE: 98 F | HEIGHT: 71 IN | OXYGEN SATURATION: 98 % | BODY MASS INDEX: 22.96 KG/M2

## 2022-04-12 DIAGNOSIS — Z79.01 LONG TERM (CURRENT) USE OF ANTICOAGULANTS: ICD-10-CM

## 2022-04-12 DIAGNOSIS — B35.4 TINEA CORPORIS: Primary | ICD-10-CM

## 2022-04-12 DIAGNOSIS — D68.59 PROTEIN S DEFICIENCY: ICD-10-CM

## 2022-04-12 DIAGNOSIS — Z79.01 LONG TERM (CURRENT) USE OF ANTICOAGULANTS: Primary | ICD-10-CM

## 2022-04-12 LAB
INR PPP: 2.4 (ref 0.8–1.2)
PROTHROMBIN TIME: 24 SEC (ref 9–12.5)

## 2022-04-12 PROCEDURE — 3074F SYST BP LT 130 MM HG: CPT | Mod: CPTII,S$GLB,, | Performed by: INTERNAL MEDICINE

## 2022-04-12 PROCEDURE — 99213 OFFICE O/P EST LOW 20 MIN: CPT | Mod: S$GLB,,, | Performed by: INTERNAL MEDICINE

## 2022-04-12 PROCEDURE — 3008F PR BODY MASS INDEX (BMI) DOCUMENTED: ICD-10-PCS | Mod: CPTII,S$GLB,, | Performed by: INTERNAL MEDICINE

## 2022-04-12 PROCEDURE — 1159F MED LIST DOCD IN RCRD: CPT | Mod: CPTII,S$GLB,, | Performed by: INTERNAL MEDICINE

## 2022-04-12 PROCEDURE — 1160F PR REVIEW ALL MEDS BY PRESCRIBER/CLIN PHARMACIST DOCUMENTED: ICD-10-PCS | Mod: CPTII,S$GLB,, | Performed by: INTERNAL MEDICINE

## 2022-04-12 PROCEDURE — 1159F PR MEDICATION LIST DOCUMENTED IN MEDICAL RECORD: ICD-10-PCS | Mod: CPTII,S$GLB,, | Performed by: INTERNAL MEDICINE

## 2022-04-12 PROCEDURE — 99213 PR OFFICE/OUTPT VISIT, EST, LEVL III, 20-29 MIN: ICD-10-PCS | Mod: S$GLB,,, | Performed by: INTERNAL MEDICINE

## 2022-04-12 PROCEDURE — 3078F DIAST BP <80 MM HG: CPT | Mod: CPTII,S$GLB,, | Performed by: INTERNAL MEDICINE

## 2022-04-12 PROCEDURE — 85610 PROTHROMBIN TIME: CPT | Performed by: INTERNAL MEDICINE

## 2022-04-12 PROCEDURE — 3074F PR MOST RECENT SYSTOLIC BLOOD PRESSURE < 130 MM HG: ICD-10-PCS | Mod: CPTII,S$GLB,, | Performed by: INTERNAL MEDICINE

## 2022-04-12 PROCEDURE — 99999 PR PBB SHADOW E&M-EST. PATIENT-LVL IV: CPT | Mod: PBBFAC,,, | Performed by: INTERNAL MEDICINE

## 2022-04-12 PROCEDURE — 1160F RVW MEDS BY RX/DR IN RCRD: CPT | Mod: CPTII,S$GLB,, | Performed by: INTERNAL MEDICINE

## 2022-04-12 PROCEDURE — 99999 PR PBB SHADOW E&M-EST. PATIENT-LVL IV: ICD-10-PCS | Mod: PBBFAC,,, | Performed by: INTERNAL MEDICINE

## 2022-04-12 PROCEDURE — 3008F BODY MASS INDEX DOCD: CPT | Mod: CPTII,S$GLB,, | Performed by: INTERNAL MEDICINE

## 2022-04-12 PROCEDURE — 3078F PR MOST RECENT DIASTOLIC BLOOD PRESSURE < 80 MM HG: ICD-10-PCS | Mod: CPTII,S$GLB,, | Performed by: INTERNAL MEDICINE

## 2022-04-12 PROCEDURE — 36415 COLL VENOUS BLD VENIPUNCTURE: CPT | Mod: PN | Performed by: INTERNAL MEDICINE

## 2022-04-12 RX ORDER — TERBINAFINE HYDROCHLORIDE 250 MG/1
250 TABLET ORAL DAILY
Qty: 14 TABLET | Refills: 1 | Status: SHIPPED | OUTPATIENT
Start: 2022-04-12 | End: 2022-04-26

## 2022-04-12 RX ORDER — ITRACONAZOLE 100 MG/1
200 CAPSULE ORAL DAILY
Qty: 20 CAPSULE | Refills: 0 | Status: CANCELLED | OUTPATIENT
Start: 2022-04-12 | End: 2022-04-22

## 2022-04-12 NOTE — PROGRESS NOTES
Subjective:      Patient ID: Jigar Saxena is a 62 y.o. male.    Chief Complaint: Recurrent Skin Infections    Here today for general exam.     He is here today for non resolution of his tinea corporis. He reports that he and his wife found a stray cat that they have taken into the house. The family got infected with the ringworm, including their dog. All family member is being treated for ringworm at this time. He does not complain of rash. He is using the ketoconazole cream once a day and he has just finished 10 days course of terbinafine. He notes that the rash has improved but then once he stopped the terbinafine the rash came back. The rash is still a lot improved from previous.     Of note, the patient takes coumadin for Protein S deficiency. No hx of blood clots.    Review of Systems   Constitutional: Negative for chills, fever and weight loss.   HENT: Negative for congestion, ear pain and sore throat.    Eyes: Negative for double vision.   Respiratory: Negative for cough and shortness of breath.    Cardiovascular: Negative for chest pain, palpitations and leg swelling.   Gastrointestinal: Negative for abdominal pain, heartburn, nausea and vomiting.   Skin: Positive for rash.   Neurological: Negative for dizziness, tingling and headaches.   Psychiatric/Behavioral: Negative for depression.         Current Outpatient Medications:     ketoconazole (NIZORAL) 2 % cream, Apply topically once daily., Disp: 60 g, Rfl: 0    multivitamin (THERAGRAN) per tablet, Take 1 tablet by mouth once daily., Disp: , Rfl:     warfarin (COUMADIN) 10 MG tablet, Take 1 tablet (10 mg total) by mouth Daily. As directed by coumadin clinic, Disp: 35 tablet, Rfl: 11    warfarin (COUMADIN) 7.5 MG tablet, TAKE 1 TABLET BY MOUTH EVERY DAY AS DIRECTED BY COUMADIN CLINIC, Disp: 20 tablet, Rfl: 5    aspirin (ECOTRIN) 81 MG EC tablet, Take 81 mg by mouth once daily., Disp: , Rfl:     terbinafine HCL (LAMISIL) 250 mg tablet, Take 1  "tablet (250 mg total) by mouth once daily. for 14 days, Disp: 14 tablet, Rfl: 1    No results found for: HGBA1C  No results found for: MICALBCREAT  Lab Results   Component Value Date    LDLCALC 108.0 06/29/2021    LDLCALC 119.4 03/29/2019    CHOL 191 06/29/2021    HDL 70 06/29/2021    TRIG 65 06/29/2021       Lab Results   Component Value Date     06/29/2021    K 4.9 06/29/2021     06/29/2021    CO2 27 06/29/2021    GLU 98 06/29/2021    BUN 14 06/29/2021    CREATININE 1.0 06/29/2021    CALCIUM 9.3 06/29/2021    PROT 6.3 06/29/2021    ALBUMIN 3.8 06/29/2021    BILITOT 0.6 06/29/2021    ALKPHOS 55 06/29/2021    AST 28 06/29/2021    ALT 25 06/29/2021    ANIONGAP 8 06/29/2021    ESTGFRAFRICA >60.0 06/29/2021    EGFRNONAA >60.0 06/29/2021    WBC 4.64 06/29/2021    HGB 13.4 (L) 06/29/2021    HGB 14.0 03/29/2019    HCT 41.9 06/29/2021    MCV 97 06/29/2021     06/29/2021    PSA 0.86 06/29/2021    PSA 0.64 07/29/2019       No results found for: LH, FSH, TOTALTESTOST, PROGESTERONE, ESTRADIOL, [id number], [id number], FERRITIN, IRON, TRANSFERRIN, TIBC, FESATURATED, ZINC      Past Medical History:   Diagnosis Date    Chronic anticoagulation 9/26/2017    Protein S deficiency 9/26/2017     History reviewed. No pertinent surgical history.  Social History     Social History Narrative    , , 2 daughters. Regular exercise     Family History   Problem Relation Age of Onset    COPD Father     Other Father         blood clots    Other Brother         blood clots    Heart disease Mother      Vitals:    04/12/22 1410   BP: 112/70   Pulse: 86   Resp: 17   Temp: 97.8 °F (36.6 °C)   SpO2: 98%   Weight: 74.4 kg (164 lb 0.4 oz)   Height: 5' 11" (1.803 m)   PainSc: 0-No pain     Objective:   Physical Exam  HENT:      Head: Normocephalic.   Eyes:      Pupils: Pupils are equal, round, and reactive to light.   Skin:     General: Skin is warm.      Findings: Rash present.             Comments: Small " circular rash with central clearing and raised borders.   Neurological:      General: No focal deficit present.      Mental Status: He is alert.   Psychiatric:         Mood and Affect: Mood normal.       Assessment:     1. Tinea corporis      Plan:     Orders Placed This Encounter    terbinafine HCL (LAMISIL) 250 mg tablet   - would need longer treatment at this time, will extend course of antifungal for complete resolution. Would not consider this as treatment failure as he responded well to initial treatment.

## 2022-05-24 ENCOUNTER — LAB VISIT (OUTPATIENT)
Dept: LAB | Facility: HOSPITAL | Age: 62
End: 2022-05-24
Attending: INTERNAL MEDICINE
Payer: COMMERCIAL

## 2022-05-24 ENCOUNTER — IMMUNIZATION (OUTPATIENT)
Dept: PHARMACY | Facility: CLINIC | Age: 62
End: 2022-05-24
Payer: COMMERCIAL

## 2022-05-24 ENCOUNTER — ANTI-COAG VISIT (OUTPATIENT)
Dept: CARDIOLOGY | Facility: CLINIC | Age: 62
End: 2022-05-24
Payer: COMMERCIAL

## 2022-05-24 DIAGNOSIS — D68.59 PROTEIN S DEFICIENCY: ICD-10-CM

## 2022-05-24 DIAGNOSIS — Z79.01 LONG TERM (CURRENT) USE OF ANTICOAGULANTS: Primary | ICD-10-CM

## 2022-05-24 DIAGNOSIS — Z23 NEED FOR VACCINATION: Primary | ICD-10-CM

## 2022-05-24 DIAGNOSIS — Z79.01 LONG TERM (CURRENT) USE OF ANTICOAGULANTS: ICD-10-CM

## 2022-05-24 LAB
INR PPP: 2.2 (ref 0.8–1.2)
PROTHROMBIN TIME: 21.7 SEC (ref 9–12.5)

## 2022-05-24 PROCEDURE — 85610 PROTHROMBIN TIME: CPT | Performed by: INTERNAL MEDICINE

## 2022-05-24 PROCEDURE — 93793 ANTICOAG MGMT PT WARFARIN: CPT | Mod: S$GLB,,, | Performed by: PHARMACIST

## 2022-05-24 PROCEDURE — 36415 COLL VENOUS BLD VENIPUNCTURE: CPT | Mod: PN | Performed by: INTERNAL MEDICINE

## 2022-05-24 PROCEDURE — 93793 PR ANTICOAGULANT MGMT FOR PT TAKING WARFARIN: ICD-10-PCS | Mod: S$GLB,,, | Performed by: PHARMACIST

## 2022-06-30 ENCOUNTER — OFFICE VISIT (OUTPATIENT)
Dept: PRIMARY CARE CLINIC | Facility: CLINIC | Age: 62
End: 2022-06-30
Payer: COMMERCIAL

## 2022-06-30 ENCOUNTER — LAB VISIT (OUTPATIENT)
Dept: LAB | Facility: HOSPITAL | Age: 62
End: 2022-06-30
Attending: INTERNAL MEDICINE
Payer: COMMERCIAL

## 2022-06-30 VITALS
TEMPERATURE: 98 F | OXYGEN SATURATION: 96 % | BODY MASS INDEX: 22.56 KG/M2 | HEIGHT: 71 IN | HEART RATE: 51 BPM | DIASTOLIC BLOOD PRESSURE: 70 MMHG | SYSTOLIC BLOOD PRESSURE: 106 MMHG | WEIGHT: 161.19 LBS

## 2022-06-30 DIAGNOSIS — Z00.00 ROUTINE GENERAL MEDICAL EXAMINATION AT A HEALTH CARE FACILITY: ICD-10-CM

## 2022-06-30 DIAGNOSIS — Z00.00 ROUTINE GENERAL MEDICAL EXAMINATION AT A HEALTH CARE FACILITY: Primary | ICD-10-CM

## 2022-06-30 DIAGNOSIS — D68.59 PROTEIN S DEFICIENCY: ICD-10-CM

## 2022-06-30 DIAGNOSIS — Z12.11 SCREENING FOR COLON CANCER: ICD-10-CM

## 2022-06-30 LAB
ALBUMIN SERPL BCP-MCNC: 3.8 G/DL (ref 3.5–5.2)
ALP SERPL-CCNC: 53 U/L (ref 55–135)
ALT SERPL W/O P-5'-P-CCNC: 21 U/L (ref 10–44)
ANION GAP SERPL CALC-SCNC: 6 MMOL/L (ref 8–16)
AST SERPL-CCNC: 21 U/L (ref 10–40)
BASOPHILS # BLD AUTO: 0.02 K/UL (ref 0–0.2)
BASOPHILS NFR BLD: 0.4 % (ref 0–1.9)
BILIRUB SERPL-MCNC: 0.6 MG/DL (ref 0.1–1)
BUN SERPL-MCNC: 16 MG/DL (ref 8–23)
CALCIUM SERPL-MCNC: 9.3 MG/DL (ref 8.7–10.5)
CHLORIDE SERPL-SCNC: 107 MMOL/L (ref 95–110)
CHOLEST SERPL-MCNC: 195 MG/DL (ref 120–199)
CHOLEST/HDLC SERPL: 2.9 {RATIO} (ref 2–5)
CO2 SERPL-SCNC: 29 MMOL/L (ref 23–29)
COMPLEXED PSA SERPL-MCNC: 0.63 NG/ML (ref 0–4)
CREAT SERPL-MCNC: 1 MG/DL (ref 0.5–1.4)
DIFFERENTIAL METHOD: ABNORMAL
EOSINOPHIL # BLD AUTO: 0.1 K/UL (ref 0–0.5)
EOSINOPHIL NFR BLD: 2.1 % (ref 0–8)
ERYTHROCYTE [DISTWIDTH] IN BLOOD BY AUTOMATED COUNT: 13.2 % (ref 11.5–14.5)
EST. GFR  (AFRICAN AMERICAN): >60 ML/MIN/1.73 M^2
EST. GFR  (NON AFRICAN AMERICAN): >60 ML/MIN/1.73 M^2
FOLATE SERPL-MCNC: 13.8 NG/ML (ref 4–24)
GLUCOSE SERPL-MCNC: 86 MG/DL (ref 70–110)
HCT VFR BLD AUTO: 44.4 % (ref 40–54)
HDLC SERPL-MCNC: 67 MG/DL (ref 40–75)
HDLC SERPL: 34.4 % (ref 20–50)
HGB BLD-MCNC: 14.5 G/DL (ref 14–18)
IMM GRANULOCYTES # BLD AUTO: 0.01 K/UL (ref 0–0.04)
IMM GRANULOCYTES NFR BLD AUTO: 0.2 % (ref 0–0.5)
LDLC SERPL CALC-MCNC: 114.4 MG/DL (ref 63–159)
LYMPHOCYTES # BLD AUTO: 1.7 K/UL (ref 1–4.8)
LYMPHOCYTES NFR BLD: 33.2 % (ref 18–48)
MCH RBC QN AUTO: 32.3 PG (ref 27–31)
MCHC RBC AUTO-ENTMCNC: 32.7 G/DL (ref 32–36)
MCV RBC AUTO: 99 FL (ref 82–98)
MONOCYTES # BLD AUTO: 0.5 K/UL (ref 0.3–1)
MONOCYTES NFR BLD: 10.2 % (ref 4–15)
NEUTROPHILS # BLD AUTO: 2.8 K/UL (ref 1.8–7.7)
NEUTROPHILS NFR BLD: 53.9 % (ref 38–73)
NONHDLC SERPL-MCNC: 128 MG/DL
NRBC BLD-RTO: 0 /100 WBC
PLATELET # BLD AUTO: 188 K/UL (ref 150–450)
PMV BLD AUTO: 11.7 FL (ref 9.2–12.9)
POTASSIUM SERPL-SCNC: 5.4 MMOL/L (ref 3.5–5.1)
PROT SERPL-MCNC: 6.4 G/DL (ref 6–8.4)
RBC # BLD AUTO: 4.49 M/UL (ref 4.6–6.2)
SODIUM SERPL-SCNC: 142 MMOL/L (ref 136–145)
TRIGL SERPL-MCNC: 68 MG/DL (ref 30–150)
WBC # BLD AUTO: 5.21 K/UL (ref 3.9–12.7)

## 2022-06-30 PROCEDURE — 1159F PR MEDICATION LIST DOCUMENTED IN MEDICAL RECORD: ICD-10-PCS | Mod: CPTII,S$GLB,, | Performed by: INTERNAL MEDICINE

## 2022-06-30 PROCEDURE — 3074F PR MOST RECENT SYSTOLIC BLOOD PRESSURE < 130 MM HG: ICD-10-PCS | Mod: CPTII,S$GLB,, | Performed by: INTERNAL MEDICINE

## 2022-06-30 PROCEDURE — 82746 ASSAY OF FOLIC ACID SERUM: CPT | Performed by: INTERNAL MEDICINE

## 2022-06-30 PROCEDURE — 80053 COMPREHEN METABOLIC PANEL: CPT | Performed by: INTERNAL MEDICINE

## 2022-06-30 PROCEDURE — 3074F SYST BP LT 130 MM HG: CPT | Mod: CPTII,S$GLB,, | Performed by: INTERNAL MEDICINE

## 2022-06-30 PROCEDURE — 1160F PR REVIEW ALL MEDS BY PRESCRIBER/CLIN PHARMACIST DOCUMENTED: ICD-10-PCS | Mod: CPTII,S$GLB,, | Performed by: INTERNAL MEDICINE

## 2022-06-30 PROCEDURE — 99999 PR PBB SHADOW E&M-EST. PATIENT-LVL IV: ICD-10-PCS | Mod: PBBFAC,,, | Performed by: INTERNAL MEDICINE

## 2022-06-30 PROCEDURE — 3078F PR MOST RECENT DIASTOLIC BLOOD PRESSURE < 80 MM HG: ICD-10-PCS | Mod: CPTII,S$GLB,, | Performed by: INTERNAL MEDICINE

## 2022-06-30 PROCEDURE — 3078F DIAST BP <80 MM HG: CPT | Mod: CPTII,S$GLB,, | Performed by: INTERNAL MEDICINE

## 2022-06-30 PROCEDURE — 36415 COLL VENOUS BLD VENIPUNCTURE: CPT | Mod: PN | Performed by: INTERNAL MEDICINE

## 2022-06-30 PROCEDURE — 85025 COMPLETE CBC W/AUTO DIFF WBC: CPT | Performed by: INTERNAL MEDICINE

## 2022-06-30 PROCEDURE — 80061 LIPID PANEL: CPT | Performed by: INTERNAL MEDICINE

## 2022-06-30 PROCEDURE — 99214 PR OFFICE/OUTPT VISIT, EST, LEVL IV, 30-39 MIN: ICD-10-PCS | Mod: S$GLB,,, | Performed by: INTERNAL MEDICINE

## 2022-06-30 PROCEDURE — 99999 PR PBB SHADOW E&M-EST. PATIENT-LVL IV: CPT | Mod: PBBFAC,,, | Performed by: INTERNAL MEDICINE

## 2022-06-30 PROCEDURE — 3008F PR BODY MASS INDEX (BMI) DOCUMENTED: ICD-10-PCS | Mod: CPTII,S$GLB,, | Performed by: INTERNAL MEDICINE

## 2022-06-30 PROCEDURE — 99214 OFFICE O/P EST MOD 30 MIN: CPT | Mod: S$GLB,,, | Performed by: INTERNAL MEDICINE

## 2022-06-30 PROCEDURE — 84153 ASSAY OF PSA TOTAL: CPT | Performed by: INTERNAL MEDICINE

## 2022-06-30 PROCEDURE — 1160F RVW MEDS BY RX/DR IN RCRD: CPT | Mod: CPTII,S$GLB,, | Performed by: INTERNAL MEDICINE

## 2022-06-30 PROCEDURE — 3008F BODY MASS INDEX DOCD: CPT | Mod: CPTII,S$GLB,, | Performed by: INTERNAL MEDICINE

## 2022-06-30 PROCEDURE — 1159F MED LIST DOCD IN RCRD: CPT | Mod: CPTII,S$GLB,, | Performed by: INTERNAL MEDICINE

## 2022-06-30 NOTE — PROGRESS NOTES
Subjective:      Patient ID: Jigar Saxena is a 62 y.o. male.    Chief Complaint: Annual Exam    Here today for general exam. 61 yo male with PMH significant for protein S deficiency (familial clotting disorder) on long term anticoagulation [facility] by the [facility], seborrheic dermatitis.     Doing very well. No complaints. We reviewed his exercise regimen. He stays fit and works out about 1 hour a day. During workout his heart rate stays around average of 130-150. Eats healthy, adheres to vegan diet.     Up to date with vaccines.   Colonoscopy - declines, opts for iFOBT    Denies any chest pain, shortness of breath, nausea vomiting constipation diarrhea, blood in stool, heartburn    Review of Systems   Constitutional: Negative for chills, fever and weight loss.   HENT: Negative for congestion, ear pain, hearing loss and sore throat.    Eyes: Negative for double vision and discharge.   Respiratory: Negative for cough, shortness of breath and wheezing.    Cardiovascular: Negative for chest pain, palpitations and leg swelling.   Gastrointestinal: Negative for abdominal pain, blood in stool, constipation, diarrhea, heartburn, nausea and vomiting.   Genitourinary: Negative for hematuria and urgency.   Musculoskeletal: Negative for neck pain.   Skin: Negative for rash.   Neurological: Negative for dizziness, tingling, weakness and headaches.   Endo/Heme/Allergies: Negative for polydipsia.   Psychiatric/Behavioral: Negative for depression.          Current Outpatient Medications:     multivitamin (THERAGRAN) per tablet, Take 1 tablet by mouth once daily., Disp: , Rfl:     warfarin (COUMADIN) 10 MG tablet, Take 1 tablet (10 mg total) by mouth Daily. As directed by coumadin clinic, Disp: 35 tablet, Rfl: 11    warfarin (COUMADIN) 7.5 MG tablet, TAKE 1 TABLET BY MOUTH EVERY DAY AS DIRECTED BY COUMADIN CLINIC, Disp: 20 tablet, Rfl: 5    No results found for: HGBA1C  No results found for: MICALBCREAT  Lab  "Results   Component Value Date    LDLCALC 108.0 06/29/2021    LDLCALC 119.4 03/29/2019    CHOL 191 06/29/2021    HDL 70 06/29/2021    TRIG 65 06/29/2021       Lab Results   Component Value Date     06/29/2021    K 4.9 06/29/2021     06/29/2021    CO2 27 06/29/2021    GLU 98 06/29/2021    BUN 14 06/29/2021    CREATININE 1.0 06/29/2021    CALCIUM 9.3 06/29/2021    PROT 6.3 06/29/2021    ALBUMIN 3.8 06/29/2021    BILITOT 0.6 06/29/2021    ALKPHOS 55 06/29/2021    AST 28 06/29/2021    ALT 25 06/29/2021    ANIONGAP 8 06/29/2021    ESTGFRAFRICA >60.0 06/29/2021    EGFRNONAA >60.0 06/29/2021    WBC 4.64 06/29/2021    HGB 13.4 (L) 06/29/2021    HGB 14.0 03/29/2019    HCT 41.9 06/29/2021    MCV 97 06/29/2021     06/29/2021    PSA 0.86 06/29/2021    PSA 0.64 07/29/2019       No results found for: LH, FSH, TOTALTESTOST, PROGESTERONE, ESTRADIOL, [id number], [id number], FERRITIN, IRON, TRANSFERRIN, TIBC, FESATURATED, ZINC      Past Medical History:   Diagnosis Date    Chronic anticoagulation 9/26/2017    Protein S deficiency 9/26/2017     History reviewed. No pertinent surgical history.  Social History     Social History Narrative    , , 2 daughters. Regular exercise     Family History   Problem Relation Age of Onset    Heart disease Mother     COPD Father     Protein S deficiency Father         blood clots    Arthritis Father     Hypertension Father     Stroke Father     Protein S deficiency Sister     Protein S deficiency Brother         blood clots     Vitals:    06/30/22 0855   BP: 106/70   Pulse: (!) 51   Temp: 97.5 °F (36.4 °C)   SpO2: 96%   Weight: 73.1 kg (161 lb 2.5 oz)   Height: 5' 11" (1.803 m)   PainSc: 0-No pain     Objective:   Physical Exam  Vitals and nursing note reviewed.   Constitutional:       Appearance: Normal appearance.   HENT:      Head: Normocephalic and atraumatic.      Right Ear: Tympanic membrane, ear canal and external ear normal.      Left Ear: " Tympanic membrane, ear canal and external ear normal.      Nose: Nose normal.      Mouth/Throat:      Mouth: Mucous membranes are moist.      Pharynx: Oropharynx is clear.   Eyes:      Extraocular Movements: Extraocular movements intact.      Pupils: Pupils are equal, round, and reactive to light.   Cardiovascular:      Rate and Rhythm: Normal rate and regular rhythm.      Pulses: Normal pulses.      Heart sounds: Normal heart sounds.   Pulmonary:      Breath sounds: Normal breath sounds.   Abdominal:      General: Bowel sounds are normal.      Palpations: Abdomen is soft.   Musculoskeletal:      Cervical back: Normal range of motion and neck supple.   Skin:     General: Skin is warm.   Neurological:      General: No focal deficit present.      Mental Status: He is alert and oriented to person, place, and time.       Assessment/Plan     Jigar Saxena is a 62 y.o.male with:    Routine general medical examination at a health care facility  -     CBC Auto Differential; Future; Expected date: 06/30/2022  -     Comprehensive Metabolic Panel; Future; Expected date: 06/30/2022  -     PSA, Screening; Future; Expected date: 06/30/2022  -     Lipid Panel; Future; Expected date: 06/30/2022  -     FOLATE; Future; Expected date: 06/30/2022    Protein S deficiency  - continue coumadin  - labs checked q6 weekly via coumadin clinic  - Coumadin 10mg T/W/F/S/Sun and 7.5mg on M/Th    Screening for colon cancer  -     Fecal Immunochemical Test (iFOBT); Future; Expected date: 11/01/2022         Chronic conditions status updated as per HPI.  Other than changes above, cont current medications and maintain follow up with specialists.  Return to clinic in Follow up in about 1 year (around 6/30/2023).      Jen Laboy MD  [facility]    Patient Instructions   1. 12 months for well visit or sooner if needed.   2. Labs are fasting. Please do not eat or drink anything other than water for 6-8 hrs prior to your lab work.                                       Answers for HPI/ROS submitted by the patient on 6/27/2022  activity change: No  unexpected weight change: No  rhinorrhea: No  trouble swallowing: No  visual disturbance: No  chest tightness: No  polyuria: No  difficulty urinating: No  joint swelling: No  arthralgias: No  confusion: No  dysphoric mood: No

## 2022-06-30 NOTE — PATIENT INSTRUCTIONS
12 months for well visit or sooner if needed.   Labs are fasting. Please do not eat or drink anything other than water for 6-8 hrs prior to your lab work.

## 2022-07-01 ENCOUNTER — PATIENT MESSAGE (OUTPATIENT)
Dept: PRIMARY CARE CLINIC | Facility: CLINIC | Age: 62
End: 2022-07-01
Payer: COMMERCIAL

## 2022-07-01 DIAGNOSIS — E87.5 HYPERKALEMIA: Primary | ICD-10-CM

## 2022-07-01 NOTE — PROGRESS NOTES
Hello!    Your potassium value is mildly elevated. I would like to recheck this next week to make sure this normalizes. If you are taking potassium supplements, I would discontinue that at this time. My staff will reach out to schedule blood draw for you to recheck your potassium level.     Your sugar number (Glucose) is within normal limits, you do not have diabetes.   Rest of your electrolytes are unremarkable.    Your blood count (Hemoglobin) is unremarkable, and you are not anemic. Other values on your CBC panel that are mildly elevated or depressed (eg. MCV, MCHC, RDW) are not a cause for concern given your unremarkable hemoglobin and hematocrit value.     Your kidney (BUN, Creatinine and GFT) function is unremarkable.   Your liver (AST, ALT) function is unremarkable. Mildly low levels of alkaline phosphatase is not a cause for concern given your unremarkable AST and ALT values.   These are the filters in your body for medicine, food and liquids that you ingest.    Your Cholesterol is NORMAL. Continue to focus on low fat, high fiber foods and aerobic exericse (huffing & puffing) for at least 20 minutes most days of the week.    Your Thyroid numbers are normal.  Your folate values are normal.    Your prostate number are normal.       Chanda - can we get him scheduled for BMP next week? Any time after Tuesday.

## 2022-07-05 ENCOUNTER — LAB VISIT (OUTPATIENT)
Dept: LAB | Facility: HOSPITAL | Age: 62
End: 2022-07-05
Attending: INTERNAL MEDICINE
Payer: COMMERCIAL

## 2022-07-05 ENCOUNTER — PATIENT MESSAGE (OUTPATIENT)
Dept: PRIMARY CARE CLINIC | Facility: CLINIC | Age: 62
End: 2022-07-05
Payer: COMMERCIAL

## 2022-07-05 ENCOUNTER — ANTI-COAG VISIT (OUTPATIENT)
Dept: CARDIOLOGY | Facility: CLINIC | Age: 62
End: 2022-07-05
Payer: COMMERCIAL

## 2022-07-05 DIAGNOSIS — Z79.01 LONG TERM (CURRENT) USE OF ANTICOAGULANTS: Primary | ICD-10-CM

## 2022-07-05 DIAGNOSIS — D68.59 PROTEIN S DEFICIENCY: ICD-10-CM

## 2022-07-05 DIAGNOSIS — Z79.01 LONG TERM (CURRENT) USE OF ANTICOAGULANTS: ICD-10-CM

## 2022-07-05 LAB
INR PPP: 3.6 (ref 0.8–1.2)
PROTHROMBIN TIME: 34.8 SEC (ref 9–12.5)

## 2022-07-05 PROCEDURE — 93793 ANTICOAG MGMT PT WARFARIN: CPT | Mod: S$GLB,,, | Performed by: PHARMACIST

## 2022-07-05 PROCEDURE — 85610 PROTHROMBIN TIME: CPT | Performed by: INTERNAL MEDICINE

## 2022-07-05 PROCEDURE — 93793 PR ANTICOAGULANT MGMT FOR PT TAKING WARFARIN: ICD-10-PCS | Mod: S$GLB,,, | Performed by: PHARMACIST

## 2022-07-05 PROCEDURE — 36415 COLL VENOUS BLD VENIPUNCTURE: CPT | Mod: PN | Performed by: INTERNAL MEDICINE

## 2022-07-05 NOTE — Clinical Note
Previously daily headache now less common  Does not want to take medication as she is a   There was concern previously for IIH due to finding of empty sella on MRI and visual complaints  Has been to see Dr Antonina Green in the interrim and exam was not concerning for IIH  Continue to monitor  US

## 2022-07-05 NOTE — PROGRESS NOTES
Patient missed his coumadin dose on 6/30.  He took coumadin  13.75mg on 7/1 and 7/2  Had a marguarita on 7/3

## 2022-07-14 ENCOUNTER — LAB VISIT (OUTPATIENT)
Dept: LAB | Facility: HOSPITAL | Age: 62
End: 2022-07-14
Attending: INTERNAL MEDICINE
Payer: COMMERCIAL

## 2022-07-14 ENCOUNTER — ANTI-COAG VISIT (OUTPATIENT)
Dept: CARDIOLOGY | Facility: CLINIC | Age: 62
End: 2022-07-14
Payer: COMMERCIAL

## 2022-07-14 DIAGNOSIS — D68.59 PROTEIN S DEFICIENCY: ICD-10-CM

## 2022-07-14 DIAGNOSIS — Z79.01 LONG TERM (CURRENT) USE OF ANTICOAGULANTS: ICD-10-CM

## 2022-07-14 DIAGNOSIS — Z79.01 LONG TERM (CURRENT) USE OF ANTICOAGULANTS: Primary | ICD-10-CM

## 2022-07-14 LAB
INR PPP: 2.4 (ref 0.8–1.2)
PROTHROMBIN TIME: 24 SEC (ref 9–12.5)

## 2022-07-14 PROCEDURE — 93793 ANTICOAG MGMT PT WARFARIN: CPT | Mod: S$GLB,,, | Performed by: PHARMACIST

## 2022-07-14 PROCEDURE — 36415 COLL VENOUS BLD VENIPUNCTURE: CPT | Mod: PN | Performed by: INTERNAL MEDICINE

## 2022-07-14 PROCEDURE — 85610 PROTHROMBIN TIME: CPT | Performed by: INTERNAL MEDICINE

## 2022-07-14 PROCEDURE — 93793 PR ANTICOAGULANT MGMT FOR PT TAKING WARFARIN: ICD-10-PCS | Mod: S$GLB,,, | Performed by: PHARMACIST

## 2022-07-28 ENCOUNTER — LAB VISIT (OUTPATIENT)
Dept: LAB | Facility: HOSPITAL | Age: 62
End: 2022-07-28
Attending: INTERNAL MEDICINE
Payer: COMMERCIAL

## 2022-07-28 ENCOUNTER — ANTI-COAG VISIT (OUTPATIENT)
Dept: CARDIOLOGY | Facility: CLINIC | Age: 62
End: 2022-07-28
Payer: COMMERCIAL

## 2022-07-28 DIAGNOSIS — D68.59 PROTEIN S DEFICIENCY: ICD-10-CM

## 2022-07-28 DIAGNOSIS — Z79.01 LONG TERM (CURRENT) USE OF ANTICOAGULANTS: ICD-10-CM

## 2022-07-28 DIAGNOSIS — Z79.01 LONG TERM (CURRENT) USE OF ANTICOAGULANTS: Primary | ICD-10-CM

## 2022-07-28 LAB
INR PPP: 1.7 (ref 0.8–1.2)
PROTHROMBIN TIME: 17.5 SEC (ref 9–12.5)

## 2022-07-28 PROCEDURE — 93793 PR ANTICOAGULANT MGMT FOR PT TAKING WARFARIN: ICD-10-PCS | Mod: S$GLB,,, | Performed by: PHARMACIST

## 2022-07-28 PROCEDURE — 85610 PROTHROMBIN TIME: CPT | Performed by: INTERNAL MEDICINE

## 2022-07-28 PROCEDURE — 93793 ANTICOAG MGMT PT WARFARIN: CPT | Mod: S$GLB,,, | Performed by: PHARMACIST

## 2022-07-28 PROCEDURE — 36415 COLL VENOUS BLD VENIPUNCTURE: CPT | Mod: PN | Performed by: INTERNAL MEDICINE

## 2022-08-11 ENCOUNTER — LAB VISIT (OUTPATIENT)
Dept: LAB | Facility: HOSPITAL | Age: 62
End: 2022-08-11
Attending: INTERNAL MEDICINE
Payer: COMMERCIAL

## 2022-08-11 ENCOUNTER — ANTI-COAG VISIT (OUTPATIENT)
Dept: CARDIOLOGY | Facility: CLINIC | Age: 62
End: 2022-08-11
Payer: COMMERCIAL

## 2022-08-11 DIAGNOSIS — D68.59 PROTEIN S DEFICIENCY: ICD-10-CM

## 2022-08-11 DIAGNOSIS — Z79.01 LONG TERM (CURRENT) USE OF ANTICOAGULANTS: Primary | ICD-10-CM

## 2022-08-11 DIAGNOSIS — Z79.01 LONG TERM (CURRENT) USE OF ANTICOAGULANTS: ICD-10-CM

## 2022-08-11 LAB
INR PPP: 1.8 (ref 0.8–1.2)
PROTHROMBIN TIME: 18.5 SEC (ref 9–12.5)

## 2022-08-11 PROCEDURE — 36415 COLL VENOUS BLD VENIPUNCTURE: CPT | Mod: PN | Performed by: INTERNAL MEDICINE

## 2022-08-11 PROCEDURE — 85610 PROTHROMBIN TIME: CPT | Performed by: INTERNAL MEDICINE

## 2022-08-11 PROCEDURE — 93793 PR ANTICOAGULANT MGMT FOR PT TAKING WARFARIN: ICD-10-PCS | Mod: S$GLB,,, | Performed by: PHARMACIST

## 2022-08-11 PROCEDURE — 93793 ANTICOAG MGMT PT WARFARIN: CPT | Mod: S$GLB,,, | Performed by: PHARMACIST

## 2022-08-11 NOTE — PROGRESS NOTES
Patient has been eating Salads/Mixed greens, spinach, Vj, Arugula- medium sized portions.  Also ate (Broccoli 1/2 cup).He also has been drinking 2oz of wine for last 3 days. Confirmed correct dosage of warfarin.      Medications

## 2022-08-18 ENCOUNTER — LAB VISIT (OUTPATIENT)
Dept: LAB | Facility: HOSPITAL | Age: 62
End: 2022-08-18
Attending: INTERNAL MEDICINE
Payer: COMMERCIAL

## 2022-08-18 ENCOUNTER — ANTI-COAG VISIT (OUTPATIENT)
Dept: CARDIOLOGY | Facility: CLINIC | Age: 62
End: 2022-08-18
Payer: COMMERCIAL

## 2022-08-18 DIAGNOSIS — D68.59 PROTEIN S DEFICIENCY: ICD-10-CM

## 2022-08-18 DIAGNOSIS — Z79.01 LONG TERM (CURRENT) USE OF ANTICOAGULANTS: ICD-10-CM

## 2022-08-18 DIAGNOSIS — Z79.01 LONG TERM (CURRENT) USE OF ANTICOAGULANTS: Primary | ICD-10-CM

## 2022-08-18 LAB
INR PPP: 2.1 (ref 0.8–1.2)
PROTHROMBIN TIME: 21.1 SEC (ref 9–12.5)

## 2022-08-18 PROCEDURE — 93793 PR ANTICOAGULANT MGMT FOR PT TAKING WARFARIN: ICD-10-PCS | Mod: S$GLB,,, | Performed by: PHARMACIST

## 2022-08-18 PROCEDURE — 93793 ANTICOAG MGMT PT WARFARIN: CPT | Mod: S$GLB,,, | Performed by: PHARMACIST

## 2022-08-18 PROCEDURE — 85610 PROTHROMBIN TIME: CPT | Performed by: INTERNAL MEDICINE

## 2022-08-18 PROCEDURE — 36415 COLL VENOUS BLD VENIPUNCTURE: CPT | Mod: PN | Performed by: INTERNAL MEDICINE

## 2022-09-01 ENCOUNTER — ANTI-COAG VISIT (OUTPATIENT)
Dept: CARDIOLOGY | Facility: CLINIC | Age: 62
End: 2022-09-01
Payer: COMMERCIAL

## 2022-09-01 ENCOUNTER — LAB VISIT (OUTPATIENT)
Dept: LAB | Facility: HOSPITAL | Age: 62
End: 2022-09-01
Attending: INTERNAL MEDICINE
Payer: COMMERCIAL

## 2022-09-01 DIAGNOSIS — Z79.01 LONG TERM (CURRENT) USE OF ANTICOAGULANTS: ICD-10-CM

## 2022-09-01 DIAGNOSIS — Z79.01 LONG TERM (CURRENT) USE OF ANTICOAGULANTS: Primary | ICD-10-CM

## 2022-09-01 DIAGNOSIS — D68.59 PROTEIN S DEFICIENCY: ICD-10-CM

## 2022-09-01 LAB
INR PPP: 2.5 (ref 0.8–1.2)
PROTHROMBIN TIME: 24.5 SEC (ref 9–12.5)

## 2022-09-01 PROCEDURE — 85610 PROTHROMBIN TIME: CPT | Performed by: INTERNAL MEDICINE

## 2022-09-01 PROCEDURE — 36415 COLL VENOUS BLD VENIPUNCTURE: CPT | Mod: PN | Performed by: INTERNAL MEDICINE

## 2022-09-01 PROCEDURE — 93793 ANTICOAG MGMT PT WARFARIN: CPT | Mod: S$GLB,,, | Performed by: PHARMACIST

## 2022-09-01 PROCEDURE — 93793 PR ANTICOAGULANT MGMT FOR PT TAKING WARFARIN: ICD-10-PCS | Mod: S$GLB,,, | Performed by: PHARMACIST

## 2022-09-22 ENCOUNTER — ANTI-COAG VISIT (OUTPATIENT)
Dept: CARDIOLOGY | Facility: CLINIC | Age: 62
End: 2022-09-22
Payer: COMMERCIAL

## 2022-09-22 ENCOUNTER — LAB VISIT (OUTPATIENT)
Dept: LAB | Facility: HOSPITAL | Age: 62
End: 2022-09-22
Attending: INTERNAL MEDICINE
Payer: COMMERCIAL

## 2022-09-22 DIAGNOSIS — Z79.01 LONG TERM (CURRENT) USE OF ANTICOAGULANTS: Primary | ICD-10-CM

## 2022-09-22 DIAGNOSIS — D68.59 PROTEIN S DEFICIENCY: ICD-10-CM

## 2022-09-22 DIAGNOSIS — Z79.01 LONG TERM (CURRENT) USE OF ANTICOAGULANTS: ICD-10-CM

## 2022-09-22 LAB
INR PPP: 1.9 (ref 0.8–1.2)
PROTHROMBIN TIME: 19.2 SEC (ref 9–12.5)

## 2022-09-22 PROCEDURE — 93793 PR ANTICOAGULANT MGMT FOR PT TAKING WARFARIN: ICD-10-PCS | Mod: S$GLB,,,

## 2022-09-22 PROCEDURE — 93793 ANTICOAG MGMT PT WARFARIN: CPT | Mod: S$GLB,,,

## 2022-09-22 PROCEDURE — 36415 COLL VENOUS BLD VENIPUNCTURE: CPT | Mod: PN | Performed by: INTERNAL MEDICINE

## 2022-09-22 PROCEDURE — 85610 PROTHROMBIN TIME: CPT | Performed by: INTERNAL MEDICINE

## 2022-10-06 ENCOUNTER — ANTI-COAG VISIT (OUTPATIENT)
Dept: CARDIOLOGY | Facility: CLINIC | Age: 62
End: 2022-10-06
Payer: COMMERCIAL

## 2022-10-06 ENCOUNTER — LAB VISIT (OUTPATIENT)
Dept: LAB | Facility: HOSPITAL | Age: 62
End: 2022-10-06
Attending: INTERNAL MEDICINE
Payer: COMMERCIAL

## 2022-10-06 DIAGNOSIS — Z79.01 LONG TERM (CURRENT) USE OF ANTICOAGULANTS: Primary | ICD-10-CM

## 2022-10-06 DIAGNOSIS — D68.59 PROTEIN S DEFICIENCY: ICD-10-CM

## 2022-10-06 DIAGNOSIS — Z79.01 LONG TERM (CURRENT) USE OF ANTICOAGULANTS: ICD-10-CM

## 2022-10-06 LAB
INR PPP: 2.2 (ref 0.8–1.2)
PROTHROMBIN TIME: 21.9 SEC (ref 9–12.5)

## 2022-10-06 PROCEDURE — 36415 COLL VENOUS BLD VENIPUNCTURE: CPT | Mod: PN | Performed by: INTERNAL MEDICINE

## 2022-10-06 PROCEDURE — 85610 PROTHROMBIN TIME: CPT | Performed by: INTERNAL MEDICINE

## 2022-10-06 PROCEDURE — 93793 PR ANTICOAGULANT MGMT FOR PT TAKING WARFARIN: ICD-10-PCS | Mod: S$GLB,,,

## 2022-10-06 PROCEDURE — 93793 ANTICOAG MGMT PT WARFARIN: CPT | Mod: S$GLB,,,

## 2022-10-17 ENCOUNTER — LAB VISIT (OUTPATIENT)
Dept: LAB | Facility: HOSPITAL | Age: 62
End: 2022-10-17
Attending: INTERNAL MEDICINE
Payer: COMMERCIAL

## 2022-10-17 ENCOUNTER — ANTI-COAG VISIT (OUTPATIENT)
Dept: CARDIOLOGY | Facility: CLINIC | Age: 62
End: 2022-10-17
Payer: COMMERCIAL

## 2022-10-17 DIAGNOSIS — D68.59 PROTEIN S DEFICIENCY: ICD-10-CM

## 2022-10-17 DIAGNOSIS — Z79.01 LONG TERM (CURRENT) USE OF ANTICOAGULANTS: Primary | ICD-10-CM

## 2022-10-17 DIAGNOSIS — Z79.01 LONG TERM (CURRENT) USE OF ANTICOAGULANTS: ICD-10-CM

## 2022-10-17 LAB
INR PPP: 2.8 (ref 0.8–1.2)
PROTHROMBIN TIME: 27.2 SEC (ref 9–12.5)

## 2022-10-17 PROCEDURE — 93793 PR ANTICOAGULANT MGMT FOR PT TAKING WARFARIN: ICD-10-PCS | Mod: S$GLB,,, | Performed by: PHARMACIST

## 2022-10-17 PROCEDURE — 93793 ANTICOAG MGMT PT WARFARIN: CPT | Mod: S$GLB,,, | Performed by: PHARMACIST

## 2022-10-17 PROCEDURE — 85610 PROTHROMBIN TIME: CPT | Performed by: INTERNAL MEDICINE

## 2022-10-17 PROCEDURE — 36415 COLL VENOUS BLD VENIPUNCTURE: CPT | Mod: PN | Performed by: INTERNAL MEDICINE

## 2022-10-31 ENCOUNTER — LAB VISIT (OUTPATIENT)
Dept: LAB | Facility: HOSPITAL | Age: 62
End: 2022-10-31
Attending: INTERNAL MEDICINE
Payer: COMMERCIAL

## 2022-10-31 ENCOUNTER — ANTI-COAG VISIT (OUTPATIENT)
Dept: CARDIOLOGY | Facility: CLINIC | Age: 62
End: 2022-10-31
Payer: COMMERCIAL

## 2022-10-31 DIAGNOSIS — Z79.01 LONG TERM (CURRENT) USE OF ANTICOAGULANTS: Primary | ICD-10-CM

## 2022-10-31 DIAGNOSIS — D68.59 PROTEIN S DEFICIENCY: ICD-10-CM

## 2022-10-31 DIAGNOSIS — Z79.01 LONG TERM (CURRENT) USE OF ANTICOAGULANTS: ICD-10-CM

## 2022-10-31 LAB
INR PPP: 2.4 (ref 0.8–1.2)
PROTHROMBIN TIME: 24 SEC (ref 9–12.5)

## 2022-10-31 PROCEDURE — 85610 PROTHROMBIN TIME: CPT | Performed by: INTERNAL MEDICINE

## 2022-10-31 PROCEDURE — 93793 ANTICOAG MGMT PT WARFARIN: CPT | Mod: S$GLB,,, | Performed by: PHARMACIST

## 2022-10-31 PROCEDURE — 93793 PR ANTICOAGULANT MGMT FOR PT TAKING WARFARIN: ICD-10-PCS | Mod: S$GLB,,, | Performed by: PHARMACIST

## 2022-10-31 PROCEDURE — 36415 COLL VENOUS BLD VENIPUNCTURE: CPT | Mod: PN | Performed by: INTERNAL MEDICINE

## 2022-11-21 ENCOUNTER — LAB VISIT (OUTPATIENT)
Dept: LAB | Facility: HOSPITAL | Age: 62
End: 2022-11-21
Attending: INTERNAL MEDICINE
Payer: COMMERCIAL

## 2022-11-21 ENCOUNTER — ANTI-COAG VISIT (OUTPATIENT)
Dept: CARDIOLOGY | Facility: CLINIC | Age: 62
End: 2022-11-21
Payer: COMMERCIAL

## 2022-11-21 DIAGNOSIS — D68.59 PROTEIN S DEFICIENCY: ICD-10-CM

## 2022-11-21 DIAGNOSIS — Z79.01 LONG TERM (CURRENT) USE OF ANTICOAGULANTS: Primary | ICD-10-CM

## 2022-11-21 DIAGNOSIS — Z79.01 LONG TERM (CURRENT) USE OF ANTICOAGULANTS: ICD-10-CM

## 2022-11-21 LAB
INR PPP: 2.7 (ref 0.8–1.2)
PROTHROMBIN TIME: 26.6 SEC (ref 9–12.5)

## 2022-11-21 PROCEDURE — 93793 PR ANTICOAGULANT MGMT FOR PT TAKING WARFARIN: ICD-10-PCS | Mod: S$GLB,,,

## 2022-11-21 PROCEDURE — 85610 PROTHROMBIN TIME: CPT | Performed by: INTERNAL MEDICINE

## 2022-11-21 PROCEDURE — 36415 COLL VENOUS BLD VENIPUNCTURE: CPT | Mod: PN | Performed by: INTERNAL MEDICINE

## 2022-11-21 PROCEDURE — 93793 ANTICOAG MGMT PT WARFARIN: CPT | Mod: S$GLB,,,

## 2022-11-28 DIAGNOSIS — Z79.01 LONG TERM CURRENT USE OF ANTICOAGULANT THERAPY: ICD-10-CM

## 2022-11-28 DIAGNOSIS — D68.59 PROTEIN S DEFICIENCY: ICD-10-CM

## 2022-11-29 RX ORDER — WARFARIN 7.5 MG/1
7.5 TABLET ORAL DAILY
Qty: 20 TABLET | Refills: 5 | Status: SHIPPED | OUTPATIENT
Start: 2022-11-29 | End: 2023-10-26

## 2022-11-29 RX ORDER — WARFARIN 7.5 MG/1
TABLET ORAL
Qty: 20 TABLET | Refills: 5 | Status: SHIPPED | OUTPATIENT
Start: 2022-11-29 | End: 2023-12-11 | Stop reason: SDUPTHER

## 2022-12-12 ENCOUNTER — LAB VISIT (OUTPATIENT)
Dept: LAB | Facility: HOSPITAL | Age: 62
End: 2022-12-12
Attending: INTERNAL MEDICINE
Payer: COMMERCIAL

## 2022-12-12 DIAGNOSIS — Z79.01 LONG TERM (CURRENT) USE OF ANTICOAGULANTS: ICD-10-CM

## 2022-12-12 DIAGNOSIS — D68.59 PROTEIN S DEFICIENCY: ICD-10-CM

## 2022-12-12 LAB
INR PPP: 3.1 (ref 0.8–1.2)
PROTHROMBIN TIME: 30.2 SEC (ref 9–12.5)

## 2022-12-12 PROCEDURE — 36415 COLL VENOUS BLD VENIPUNCTURE: CPT | Mod: PN | Performed by: INTERNAL MEDICINE

## 2022-12-12 PROCEDURE — 85610 PROTHROMBIN TIME: CPT | Performed by: INTERNAL MEDICINE

## 2022-12-13 ENCOUNTER — ANTI-COAG VISIT (OUTPATIENT)
Dept: CARDIOLOGY | Facility: CLINIC | Age: 62
End: 2022-12-13
Payer: COMMERCIAL

## 2022-12-13 DIAGNOSIS — Z79.01 LONG TERM (CURRENT) USE OF ANTICOAGULANTS: Primary | ICD-10-CM

## 2022-12-13 DIAGNOSIS — D68.59 PROTEIN S DEFICIENCY: ICD-10-CM

## 2022-12-13 PROCEDURE — 93793 ANTICOAG MGMT PT WARFARIN: CPT | Mod: S$GLB,,, | Performed by: PHARMACIST

## 2022-12-13 PROCEDURE — 93793 PR ANTICOAGULANT MGMT FOR PT TAKING WARFARIN: ICD-10-PCS | Mod: S$GLB,,, | Performed by: PHARMACIST

## 2023-01-03 ENCOUNTER — ANTI-COAG VISIT (OUTPATIENT)
Dept: CARDIOLOGY | Facility: CLINIC | Age: 63
End: 2023-01-03
Payer: COMMERCIAL

## 2023-01-03 ENCOUNTER — LAB VISIT (OUTPATIENT)
Dept: LAB | Facility: HOSPITAL | Age: 63
End: 2023-01-03
Attending: INTERNAL MEDICINE
Payer: COMMERCIAL

## 2023-01-03 DIAGNOSIS — Z79.01 LONG TERM (CURRENT) USE OF ANTICOAGULANTS: ICD-10-CM

## 2023-01-03 DIAGNOSIS — D68.59 PROTEIN S DEFICIENCY: ICD-10-CM

## 2023-01-03 DIAGNOSIS — Z79.01 LONG TERM (CURRENT) USE OF ANTICOAGULANTS: Primary | ICD-10-CM

## 2023-01-03 LAB
INR PPP: 2 (ref 0.8–1.2)
PROTHROMBIN TIME: 19.8 SEC (ref 9–12.5)

## 2023-01-03 PROCEDURE — 93793 PR ANTICOAGULANT MGMT FOR PT TAKING WARFARIN: ICD-10-PCS | Mod: S$GLB,,, | Performed by: PHARMACIST

## 2023-01-03 PROCEDURE — 85610 PROTHROMBIN TIME: CPT | Performed by: INTERNAL MEDICINE

## 2023-01-03 PROCEDURE — 93793 ANTICOAG MGMT PT WARFARIN: CPT | Mod: S$GLB,,, | Performed by: PHARMACIST

## 2023-01-03 PROCEDURE — 36415 COLL VENOUS BLD VENIPUNCTURE: CPT | Mod: PN | Performed by: INTERNAL MEDICINE

## 2023-01-24 ENCOUNTER — LAB VISIT (OUTPATIENT)
Dept: LAB | Facility: HOSPITAL | Age: 63
End: 2023-01-24
Payer: COMMERCIAL

## 2023-01-24 ENCOUNTER — ANTI-COAG VISIT (OUTPATIENT)
Dept: CARDIOLOGY | Facility: CLINIC | Age: 63
End: 2023-01-24
Payer: COMMERCIAL

## 2023-01-24 DIAGNOSIS — Z79.01 LONG TERM (CURRENT) USE OF ANTICOAGULANTS: ICD-10-CM

## 2023-01-24 DIAGNOSIS — D68.59 PROTEIN S DEFICIENCY: ICD-10-CM

## 2023-01-24 DIAGNOSIS — Z79.01 LONG TERM (CURRENT) USE OF ANTICOAGULANTS: Primary | ICD-10-CM

## 2023-01-24 LAB
INR PPP: 2.2 (ref 0.8–1.2)
PROTHROMBIN TIME: 21.6 SEC (ref 9–12.5)

## 2023-01-24 PROCEDURE — 85610 PROTHROMBIN TIME: CPT | Performed by: INTERNAL MEDICINE

## 2023-01-24 PROCEDURE — 93793 ANTICOAG MGMT PT WARFARIN: CPT | Mod: S$GLB,,, | Performed by: PHARMACIST

## 2023-01-24 PROCEDURE — 36415 COLL VENOUS BLD VENIPUNCTURE: CPT | Mod: PN | Performed by: INTERNAL MEDICINE

## 2023-01-24 PROCEDURE — 93793 PR ANTICOAGULANT MGMT FOR PT TAKING WARFARIN: ICD-10-PCS | Mod: S$GLB,,, | Performed by: PHARMACIST

## 2023-01-24 NOTE — PROGRESS NOTES
Patient was given lab result, verified warfarin: 10mg daily except 7.5mg on Sundays for the last 3 months--knows he's supposed to tae the 7.5mg on Mondays but easier to remember Sundays, no other changes reported

## 2023-02-20 ENCOUNTER — LAB VISIT (OUTPATIENT)
Dept: LAB | Facility: HOSPITAL | Age: 63
End: 2023-02-20
Payer: COMMERCIAL

## 2023-02-20 ENCOUNTER — ANTI-COAG VISIT (OUTPATIENT)
Dept: CARDIOLOGY | Facility: CLINIC | Age: 63
End: 2023-02-20
Payer: COMMERCIAL

## 2023-02-20 DIAGNOSIS — Z79.01 LONG TERM (CURRENT) USE OF ANTICOAGULANTS: ICD-10-CM

## 2023-02-20 DIAGNOSIS — D68.59 PROTEIN S DEFICIENCY: ICD-10-CM

## 2023-02-20 DIAGNOSIS — Z79.01 LONG TERM (CURRENT) USE OF ANTICOAGULANTS: Primary | ICD-10-CM

## 2023-02-20 LAB
INR PPP: 2.7 (ref 0.8–1.2)
PROTHROMBIN TIME: 27.2 SEC (ref 9–12.5)

## 2023-02-20 PROCEDURE — 93793 ANTICOAG MGMT PT WARFARIN: CPT | Mod: S$GLB,,, | Performed by: PHARMACIST

## 2023-02-20 PROCEDURE — 85610 PROTHROMBIN TIME: CPT | Performed by: INTERNAL MEDICINE

## 2023-02-20 PROCEDURE — 36415 COLL VENOUS BLD VENIPUNCTURE: CPT | Mod: PN | Performed by: INTERNAL MEDICINE

## 2023-02-20 PROCEDURE — 93793 PR ANTICOAGULANT MGMT FOR PT TAKING WARFARIN: ICD-10-PCS | Mod: S$GLB,,, | Performed by: PHARMACIST

## 2023-03-27 ENCOUNTER — LAB VISIT (OUTPATIENT)
Dept: LAB | Facility: HOSPITAL | Age: 63
End: 2023-03-27
Payer: COMMERCIAL

## 2023-03-27 ENCOUNTER — ANTI-COAG VISIT (OUTPATIENT)
Dept: CARDIOLOGY | Facility: CLINIC | Age: 63
End: 2023-03-27
Payer: COMMERCIAL

## 2023-03-27 DIAGNOSIS — Z79.01 LONG TERM (CURRENT) USE OF ANTICOAGULANTS: Primary | ICD-10-CM

## 2023-03-27 DIAGNOSIS — Z79.01 LONG TERM (CURRENT) USE OF ANTICOAGULANTS: ICD-10-CM

## 2023-03-27 DIAGNOSIS — D68.59 PROTEIN S DEFICIENCY: ICD-10-CM

## 2023-03-27 LAB
INR PPP: 3.3 (ref 0.8–1.2)
PROTHROMBIN TIME: 32.4 SEC (ref 9–12.5)

## 2023-03-27 PROCEDURE — 93793 ANTICOAG MGMT PT WARFARIN: CPT | Mod: S$GLB,,, | Performed by: PHARMACIST

## 2023-03-27 PROCEDURE — 36415 COLL VENOUS BLD VENIPUNCTURE: CPT | Mod: PN | Performed by: INTERNAL MEDICINE

## 2023-03-27 PROCEDURE — 85610 PROTHROMBIN TIME: CPT | Performed by: INTERNAL MEDICINE

## 2023-03-27 PROCEDURE — 93793 PR ANTICOAGULANT MGMT FOR PT TAKING WARFARIN: ICD-10-PCS | Mod: S$GLB,,, | Performed by: PHARMACIST

## 2023-03-27 NOTE — PROGRESS NOTES
Patient verified correct warfarin dose, reports had less greens last week, had 1 glass of wine Saturday. INR not at goal. Medications, chart, and patient findings reviewed. See calendar for adjustments to dose and follow up plan.Patient was re-educated on situations that would require placing a call to the coumadin clinic, including bleeding or unusual bruising issues, changes in health, diet or medications, upcoming procedures that require warfarin interruption, and missed coumadin dose(s). Patient expressed understanding that avoidance of consistency with these parameters could cause fluctuations in INR, leading to more frequent visits and increase risk of adverse events.

## 2023-03-28 ENCOUNTER — OFFICE VISIT (OUTPATIENT)
Dept: PRIMARY CARE CLINIC | Facility: CLINIC | Age: 63
End: 2023-03-28
Payer: COMMERCIAL

## 2023-03-28 VITALS
BODY MASS INDEX: 22.96 KG/M2 | WEIGHT: 164 LBS | DIASTOLIC BLOOD PRESSURE: 82 MMHG | HEIGHT: 71 IN | OXYGEN SATURATION: 99 % | HEART RATE: 53 BPM | SYSTOLIC BLOOD PRESSURE: 110 MMHG | TEMPERATURE: 98 F
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
## 2023-03-28 DIAGNOSIS — S40.862A INSECT BITE OF LEFT UPPER ARM, INITIAL ENCOUNTER: Primary | ICD-10-CM

## 2023-03-28 PROCEDURE — 99999 PR PBB SHADOW E&M-EST. PATIENT-LVL III: ICD-10-PCS | Mod: PBBFAC,,, | Performed by: STUDENT IN AN ORGANIZED HEALTH CARE EDUCATION/TRAINING PROGRAM

## 2023-03-28 PROCEDURE — 3008F BODY MASS INDEX DOCD: CPT | Mod: CPTII,S$GLB,, | Performed by: STUDENT IN AN ORGANIZED HEALTH CARE EDUCATION/TRAINING PROGRAM

## 2023-03-28 PROCEDURE — 99214 OFFICE O/P EST MOD 30 MIN: CPT | Mod: S$GLB,,, | Performed by: STUDENT IN AN ORGANIZED HEALTH CARE EDUCATION/TRAINING PROGRAM

## 2023-03-28 PROCEDURE — 1159F PR MEDICATION LIST DOCUMENTED IN MEDICAL RECORD: ICD-10-PCS | Mod: CPTII,S$GLB,, | Performed by: STUDENT IN AN ORGANIZED HEALTH CARE EDUCATION/TRAINING PROGRAM

## 2023-03-28 PROCEDURE — 99999 PR PBB SHADOW E&M-EST. PATIENT-LVL III: CPT | Mod: PBBFAC,,, | Performed by: STUDENT IN AN ORGANIZED HEALTH CARE EDUCATION/TRAINING PROGRAM

## 2023-03-28 PROCEDURE — 3079F PR MOST RECENT DIASTOLIC BLOOD PRESSURE 80-89 MM HG: ICD-10-PCS | Mod: CPTII,S$GLB,, | Performed by: STUDENT IN AN ORGANIZED HEALTH CARE EDUCATION/TRAINING PROGRAM

## 2023-03-28 PROCEDURE — 3074F SYST BP LT 130 MM HG: CPT | Mod: CPTII,S$GLB,, | Performed by: STUDENT IN AN ORGANIZED HEALTH CARE EDUCATION/TRAINING PROGRAM

## 2023-03-28 PROCEDURE — 3008F PR BODY MASS INDEX (BMI) DOCUMENTED: ICD-10-PCS | Mod: CPTII,S$GLB,, | Performed by: STUDENT IN AN ORGANIZED HEALTH CARE EDUCATION/TRAINING PROGRAM

## 2023-03-28 PROCEDURE — 1159F MED LIST DOCD IN RCRD: CPT | Mod: CPTII,S$GLB,, | Performed by: STUDENT IN AN ORGANIZED HEALTH CARE EDUCATION/TRAINING PROGRAM

## 2023-03-28 PROCEDURE — 3079F DIAST BP 80-89 MM HG: CPT | Mod: CPTII,S$GLB,, | Performed by: STUDENT IN AN ORGANIZED HEALTH CARE EDUCATION/TRAINING PROGRAM

## 2023-03-28 PROCEDURE — 3074F PR MOST RECENT SYSTOLIC BLOOD PRESSURE < 130 MM HG: ICD-10-PCS | Mod: CPTII,S$GLB,, | Performed by: STUDENT IN AN ORGANIZED HEALTH CARE EDUCATION/TRAINING PROGRAM

## 2023-03-28 PROCEDURE — 99214 PR OFFICE/OUTPT VISIT, EST, LEVL IV, 30-39 MIN: ICD-10-PCS | Mod: S$GLB,,, | Performed by: STUDENT IN AN ORGANIZED HEALTH CARE EDUCATION/TRAINING PROGRAM

## 2023-03-28 RX ORDER — PREDNISONE 20 MG/1
40 TABLET ORAL DAILY
Qty: 10 TABLET | Refills: 0 | Status: SHIPPED | OUTPATIENT
Start: 2023-03-28 | End: 2023-04-02

## 2023-03-28 NOTE — PROGRESS NOTES
"  Primary Care  Return/Acute Office Visit - In Person  3/28/2023  Mwengwe Ndhlovu      [facility]    Patient is a 63 y.o.   Jigar Saxena  has a past medical history of Chronic anticoagulation (9/26/2017) and Protein S deficiency (9/26/2017).    Patient presents with   Chief Complaint   Patient presents with    Trauma     Waap sting, left hand, happened yesterday while gardening.     Patient was stung by wasp last night and has had significant swelling in his left arm. He denies any wheezing, shortness of breath, or airway compromise.       Social History     Social History Narrative    , , 2 daughters. Regular exercise     iJgar Saxena family history includes Arthritis in his father; COPD in his father; Heart disease in his mother; Hypertension in his father; Protein S deficiency in his brother, father, and sister; Stroke in his father.    Active Medications:  Review of patient's allergies indicates:  No Known Allergies  Current Outpatient Medications   Medication Instructions    multivitamin (THERAGRAN) per tablet 1 tablet, Oral, Daily    predniSONE (DELTASONE) 40 mg, Oral, Daily    warfarin (COUMADIN) 7.5 MG tablet TAKE 1 TABLET BY MOUTH EVERY DAY AS DIRECTED BY COUMADIN CLINIC    warfarin (COUMADIN) 10 mg, Oral, Daily, As directed by coumadin clinic    warfarin (COUMADIN) 7.5 mg, Oral, Daily           Vitals:    03/28/23 1534   BP: 110/82   BP Location: Right arm   Pulse: (!) 53   Temp: 98 °F (36.7 °C)   SpO2: 99%   Weight: 74.4 kg (164 lb 0.4 oz)   Height: 5' 11" (1.803 m)       Physical Exam  Cardiovascular:      Pulses:           Radial pulses are 2+ on the left side.   Musculoskeletal:      Left forearm: Swelling present.        Assessment and Plan     Local reaction insect bite  Recommended continuing benadryl   5 day course of prednisone prescribed                 Upcoming Scheduled Appointments and Follow Up:    Future Appointments   Date Time Provider Department Center "   4/10/2023  7:30 AM LAB, [place] [facility] [place]       Follow Up [facility] (with who? when?): No follow-ups on file.      Extended Emergency Contact Information  Primary Emergency Contact: Nevaeh Tysonine  Address: [address]           [place] [address] [facility]  Mobile Phone: [phone]  Relation: Spouse      Walter Lomas MD   Attending Physician  Primary Care  3/28/2023 - 3:43 PM    I spent a total of 6 minutes on the day of the visit.This includes face to face time and non-face to face time preparing to see the patient (eg, review of tests), obtaining and/or reviewing separately obtained history, documenting clinical information in the electronic or other health record, independently interpreting results and communicating results to the patient/family/caregiver, or care coordinator.

## 2023-03-30 ENCOUNTER — PATIENT MESSAGE (OUTPATIENT)
Dept: ADMINISTRATIVE | Facility: HOSPITAL | Age: 63
End: 2023-03-30
Payer: COMMERCIAL

## 2023-03-30 DIAGNOSIS — Z12.11 SCREENING FOR COLON CANCER: ICD-10-CM

## 2023-04-03 DIAGNOSIS — Z79.01 LONG TERM (CURRENT) USE OF ANTICOAGULANTS: ICD-10-CM

## 2023-04-03 DIAGNOSIS — D68.59 PROTEIN S DEFICIENCY: ICD-10-CM

## 2023-04-03 RX ORDER — WARFARIN 10 MG/1
TABLET ORAL
Qty: 35 TABLET | Refills: 11 | OUTPATIENT
Start: 2023-04-03

## 2023-04-10 ENCOUNTER — LAB VISIT (OUTPATIENT)
Dept: LAB | Facility: HOSPITAL | Age: 63
End: 2023-04-10
Payer: COMMERCIAL

## 2023-04-10 ENCOUNTER — ANTI-COAG VISIT (OUTPATIENT)
Dept: CARDIOLOGY | Facility: CLINIC | Age: 63
End: 2023-04-10
Payer: COMMERCIAL

## 2023-04-10 DIAGNOSIS — D68.59 PROTEIN S DEFICIENCY: ICD-10-CM

## 2023-04-10 DIAGNOSIS — Z79.01 LONG TERM (CURRENT) USE OF ANTICOAGULANTS: Primary | ICD-10-CM

## 2023-04-10 DIAGNOSIS — Z79.01 LONG TERM (CURRENT) USE OF ANTICOAGULANTS: ICD-10-CM

## 2023-04-10 LAB
INR PPP: 1.6 (ref 0.8–1.2)
PROTHROMBIN TIME: 16.7 SEC (ref 9–12.5)

## 2023-04-10 PROCEDURE — 93793 ANTICOAG MGMT PT WARFARIN: CPT | Mod: S$GLB,,, | Performed by: PHARMACIST

## 2023-04-10 PROCEDURE — 85610 PROTHROMBIN TIME: CPT | Performed by: INTERNAL MEDICINE

## 2023-04-10 PROCEDURE — 93793 PR ANTICOAGULANT MGMT FOR PT TAKING WARFARIN: ICD-10-PCS | Mod: S$GLB,,, | Performed by: PHARMACIST

## 2023-04-10 PROCEDURE — 36415 COLL VENOUS BLD VENIPUNCTURE: CPT | Mod: PN | Performed by: INTERNAL MEDICINE

## 2023-04-10 NOTE — PROGRESS NOTES
Patient confirmed the correct dose of coumadin. He does report that he had more than 4 servings of greens, and has not had any wine for the last couple of weeks per pt. Both of these dietary changes can cause the INR to drop. We will boost coumadin dose today then re-challenge. Patient was re-educated on situations that would require placing a call to the coumadin clinic, including bleeding or unusual bruising issues, changes in health, diet or medications, upcoming procedures that require warfarin interruption, and missed coumadin dose(s). Patient expressed understanding that avoidance of consistency with these parameters could cause fluctuations in INR, leading to more frequent visits and increase risk of adverse events.

## 2023-04-21 ENCOUNTER — PATIENT MESSAGE (OUTPATIENT)
Dept: ADMINISTRATIVE | Facility: HOSPITAL | Age: 63
End: 2023-04-21
Payer: COMMERCIAL

## 2023-04-24 ENCOUNTER — ANTI-COAG VISIT (OUTPATIENT)
Dept: CARDIOLOGY | Facility: CLINIC | Age: 63
End: 2023-04-24
Payer: COMMERCIAL

## 2023-04-24 ENCOUNTER — LAB VISIT (OUTPATIENT)
Dept: LAB | Facility: HOSPITAL | Age: 63
End: 2023-04-24
Payer: COMMERCIAL

## 2023-04-24 DIAGNOSIS — D68.59 PROTEIN S DEFICIENCY: ICD-10-CM

## 2023-04-24 DIAGNOSIS — Z79.01 LONG TERM (CURRENT) USE OF ANTICOAGULANTS: ICD-10-CM

## 2023-04-24 DIAGNOSIS — Z79.01 LONG TERM (CURRENT) USE OF ANTICOAGULANTS: Primary | ICD-10-CM

## 2023-04-24 LAB
INR PPP: 3 (ref 0.8–1.2)
PROTHROMBIN TIME: 29.3 SEC (ref 9–12.5)

## 2023-04-24 PROCEDURE — 93793 ANTICOAG MGMT PT WARFARIN: CPT | Mod: S$GLB,,, | Performed by: PHARMACIST

## 2023-04-24 PROCEDURE — 93793 PR ANTICOAGULANT MGMT FOR PT TAKING WARFARIN: ICD-10-PCS | Mod: S$GLB,,, | Performed by: PHARMACIST

## 2023-04-24 PROCEDURE — 85610 PROTHROMBIN TIME: CPT | Performed by: INTERNAL MEDICINE

## 2023-04-24 PROCEDURE — 36415 COLL VENOUS BLD VENIPUNCTURE: CPT | Mod: PN | Performed by: INTERNAL MEDICINE

## 2023-05-04 ENCOUNTER — LAB VISIT (OUTPATIENT)
Dept: LAB | Facility: HOSPITAL | Age: 63
End: 2023-05-04
Payer: COMMERCIAL

## 2023-05-04 ENCOUNTER — ANTI-COAG VISIT (OUTPATIENT)
Dept: CARDIOLOGY | Facility: CLINIC | Age: 63
End: 2023-05-04
Payer: COMMERCIAL

## 2023-05-04 DIAGNOSIS — Z79.01 LONG TERM (CURRENT) USE OF ANTICOAGULANTS: ICD-10-CM

## 2023-05-04 DIAGNOSIS — Z79.01 LONG TERM (CURRENT) USE OF ANTICOAGULANTS: Primary | ICD-10-CM

## 2023-05-04 DIAGNOSIS — D68.59 PROTEIN S DEFICIENCY: ICD-10-CM

## 2023-05-04 LAB
INR PPP: 3 (ref 0.8–1.2)
PROTHROMBIN TIME: 30.1 SEC (ref 9–12.5)

## 2023-05-04 PROCEDURE — 93793 PR ANTICOAGULANT MGMT FOR PT TAKING WARFARIN: ICD-10-PCS | Mod: S$GLB,,, | Performed by: PHARMACIST

## 2023-05-04 PROCEDURE — 85610 PROTHROMBIN TIME: CPT | Performed by: INTERNAL MEDICINE

## 2023-05-04 PROCEDURE — 36415 COLL VENOUS BLD VENIPUNCTURE: CPT | Mod: PN | Performed by: INTERNAL MEDICINE

## 2023-05-04 PROCEDURE — 93793 ANTICOAG MGMT PT WARFARIN: CPT | Mod: S$GLB,,, | Performed by: PHARMACIST

## 2023-05-18 ENCOUNTER — LAB VISIT (OUTPATIENT)
Dept: LAB | Facility: HOSPITAL | Age: 63
End: 2023-05-18
Payer: COMMERCIAL

## 2023-05-18 ENCOUNTER — ANTI-COAG VISIT (OUTPATIENT)
Dept: CARDIOLOGY | Facility: CLINIC | Age: 63
End: 2023-05-18
Payer: COMMERCIAL

## 2023-05-18 DIAGNOSIS — Z79.01 LONG TERM (CURRENT) USE OF ANTICOAGULANTS: Primary | ICD-10-CM

## 2023-05-18 DIAGNOSIS — D68.59 PROTEIN S DEFICIENCY: ICD-10-CM

## 2023-05-18 DIAGNOSIS — Z79.01 LONG TERM (CURRENT) USE OF ANTICOAGULANTS: ICD-10-CM

## 2023-05-18 LAB
INR PPP: 2.4 (ref 0.8–1.2)
PROTHROMBIN TIME: 24.8 SEC (ref 9–12.5)

## 2023-05-18 PROCEDURE — 85610 PROTHROMBIN TIME: CPT | Performed by: INTERNAL MEDICINE

## 2023-05-18 PROCEDURE — 93793 PR ANTICOAGULANT MGMT FOR PT TAKING WARFARIN: ICD-10-PCS | Mod: S$GLB,,, | Performed by: PHARMACIST

## 2023-05-18 PROCEDURE — 93793 ANTICOAG MGMT PT WARFARIN: CPT | Mod: S$GLB,,, | Performed by: PHARMACIST

## 2023-05-18 PROCEDURE — 36415 COLL VENOUS BLD VENIPUNCTURE: CPT | Mod: PN | Performed by: INTERNAL MEDICINE

## 2023-06-01 ENCOUNTER — LAB VISIT (OUTPATIENT)
Dept: LAB | Facility: HOSPITAL | Age: 63
End: 2023-06-01
Payer: COMMERCIAL

## 2023-06-01 DIAGNOSIS — D68.59 PROTEIN S DEFICIENCY: ICD-10-CM

## 2023-06-01 DIAGNOSIS — Z79.01 LONG TERM (CURRENT) USE OF ANTICOAGULANTS: ICD-10-CM

## 2023-06-01 LAB
INR PPP: 3.4 (ref 0.8–1.2)
PROTHROMBIN TIME: 33.4 SEC (ref 9–12.5)

## 2023-06-01 PROCEDURE — 85610 PROTHROMBIN TIME: CPT | Performed by: INTERNAL MEDICINE

## 2023-06-01 PROCEDURE — 36415 COLL VENOUS BLD VENIPUNCTURE: CPT | Mod: PN | Performed by: INTERNAL MEDICINE

## 2023-07-12 ENCOUNTER — ANTI-COAG VISIT (OUTPATIENT)
Dept: CARDIOLOGY | Facility: CLINIC | Age: 63
End: 2023-07-12
Payer: COMMERCIAL

## 2023-07-12 ENCOUNTER — LAB VISIT (OUTPATIENT)
Dept: LAB | Facility: HOSPITAL | Age: 63
End: 2023-07-12
Payer: COMMERCIAL

## 2023-07-12 DIAGNOSIS — Z79.01 LONG TERM (CURRENT) USE OF ANTICOAGULANTS: ICD-10-CM

## 2023-07-12 DIAGNOSIS — Z79.01 LONG TERM (CURRENT) USE OF ANTICOAGULANTS: Primary | ICD-10-CM

## 2023-07-12 DIAGNOSIS — D68.59 PROTEIN S DEFICIENCY: ICD-10-CM

## 2023-07-12 LAB
INR PPP: 2.4 (ref 0.8–1.2)
PROTHROMBIN TIME: 24.8 SEC (ref 9–12.5)

## 2023-07-12 PROCEDURE — 93793 PR ANTICOAGULANT MGMT FOR PT TAKING WARFARIN: ICD-10-PCS | Mod: S$GLB,,, | Performed by: PHARMACIST

## 2023-07-12 PROCEDURE — 93793 ANTICOAG MGMT PT WARFARIN: CPT | Mod: S$GLB,,, | Performed by: PHARMACIST

## 2023-07-12 PROCEDURE — 85610 PROTHROMBIN TIME: CPT | Performed by: INTERNAL MEDICINE

## 2023-07-12 PROCEDURE — 36415 COLL VENOUS BLD VENIPUNCTURE: CPT | Mod: PN | Performed by: INTERNAL MEDICINE

## 2023-08-15 NOTE — PROGRESS NOTES
8/15/23 Patient called to he's moving permanently to [place] on 8/17, rescheduled lab site from [facility] to [facility]

## 2023-08-23 ENCOUNTER — ANTI-COAG VISIT (OUTPATIENT)
Dept: CARDIOLOGY | Facility: CLINIC | Age: 63
End: 2023-08-23
Payer: COMMERCIAL

## 2023-08-23 DIAGNOSIS — Z79.01 LONG TERM (CURRENT) USE OF ANTICOAGULANTS: Primary | ICD-10-CM

## 2023-08-23 DIAGNOSIS — D68.59 PROTEIN S DEFICIENCY: ICD-10-CM

## 2023-08-23 PROCEDURE — 93793 ANTICOAG MGMT PT WARFARIN: CPT | Mod: S$GLB,,, | Performed by: PHARMACIST

## 2023-08-23 PROCEDURE — 93793 PR ANTICOAGULANT MGMT FOR PT TAKING WARFARIN: ICD-10-PCS | Mod: S$GLB,,, | Performed by: PHARMACIST

## 2023-10-04 ENCOUNTER — ANTI-COAG VISIT (OUTPATIENT)
Dept: CARDIOLOGY | Facility: CLINIC | Age: 63
End: 2023-10-04
Payer: COMMERCIAL

## 2023-10-04 DIAGNOSIS — D68.59 PROTEIN S DEFICIENCY: ICD-10-CM

## 2023-10-04 DIAGNOSIS — Z79.01 LONG TERM (CURRENT) USE OF ANTICOAGULANTS: Primary | ICD-10-CM

## 2023-10-04 PROCEDURE — 93793 PR ANTICOAGULANT MGMT FOR PT TAKING WARFARIN: ICD-10-PCS | Mod: S$GLB,,, | Performed by: PHARMACIST

## 2023-10-04 PROCEDURE — 93793 ANTICOAG MGMT PT WARFARIN: CPT | Mod: S$GLB,,, | Performed by: PHARMACIST

## 2023-10-04 NOTE — PROGRESS NOTES
[facility]    10/04/2023 10:02 AM    Assessment/Plan:    Patient presents today with therapeutic INR.    Assessment of patient findings and chart review: INR at goal. Medications and chart reviewed. No changes noted to necessitate adjustment of warfarin or follow-up plan. See calendar.      Recommendation for patient's warfarin regimen: Continue current maintenance dose    Recommend repeat INR in 6 weeks  _________________________________________________________________    Jigar Saxena (63 y.o.) is followed by the [facility].    Anticoagulation Summary  As of 10/4/2023      INR goal:  2.0-3.0   TTR:  84.5 % (6 y)   INR used for dosin.55 (10/4/2023)   Warfarin maintenance plan:  7.5 mg (7.5 mg x 1) every Sun; 10 mg (10 mg x 1) all other days   Weekly warfarin total:  67.5 mg   Plan last modified:  Amarjit Tompkins, PharmD (2023)   Next INR check:  11/15/2023   Target end date:      Indications    Long term (current) use of anticoagulants [Z79.01]  Protein S deficiency [D68.59]                 Anticoagulation Episode Summary       INR check location:  Clinic Lab    Preferred lab:      Send INR reminders to:  [facility] COUMADIN MONITORING POOL    Comments:  EDEL Lima          Anticoagulation Care Providers       Provider Role Specialty Phone number    Jen Laboy MD [facility] [phone]                        '

## 2023-10-08 NOTE — PROGRESS NOTES
INR good. No changes in medications, health, or diet. No signs or symptoms of bleeding. INR stable. Maintain current dose and repeat INR in 8 weeks.     Attending Attestation (For Attendings USE Only)...

## 2023-10-31 PROBLEM — N52.9 ERECTILE DYSFUNCTION: Status: ACTIVE | Noted: 2023-10-31

## 2023-12-07 ENCOUNTER — ANTI-COAG VISIT (OUTPATIENT)
Dept: CARDIOLOGY | Facility: CLINIC | Age: 63
End: 2023-12-07
Payer: COMMERCIAL

## 2023-12-07 DIAGNOSIS — Z79.01 LONG TERM (CURRENT) USE OF ANTICOAGULANTS: Primary | ICD-10-CM

## 2023-12-07 DIAGNOSIS — D68.59 PROTEIN S DEFICIENCY: ICD-10-CM

## 2023-12-07 PROCEDURE — 93793 PR ANTICOAGULANT MGMT FOR PT TAKING WARFARIN: ICD-10-PCS | Mod: S$GLB,,, | Performed by: PHARMACIST

## 2023-12-07 PROCEDURE — 93793 ANTICOAG MGMT PT WARFARIN: CPT | Mod: S$GLB,,, | Performed by: PHARMACIST

## 2023-12-11 DIAGNOSIS — D68.59 PROTEIN S DEFICIENCY: ICD-10-CM

## 2023-12-11 DIAGNOSIS — Z79.01 LONG TERM CURRENT USE OF ANTICOAGULANT THERAPY: ICD-10-CM

## 2023-12-11 RX ORDER — WARFARIN 7.5 MG/1
7.5 TABLET ORAL
Qty: 20 TABLET | Refills: 5 | Status: SHIPPED | OUTPATIENT
Start: 2023-12-11

## 2024-01-11 PROBLEM — R19.5 POSITIVE COLORECTAL CANCER SCREENING USING COLOGUARD TEST: Status: ACTIVE | Noted: 2024-01-11

## 2024-01-11 PROBLEM — K63.5 POLYP OF COLON: Status: ACTIVE | Noted: 2024-01-11

## 2024-01-17 NOTE — PROGRESS NOTES
INR good. Patient denies changes. No signs or symptoms of bleeding. Maintain current dose and routine INR   negative

## 2024-01-22 NOTE — PROGRESS NOTES
Called pt to r/s missed 1/17 INR. Pt reports he stopped warfarin 1/6-1/11 for colonoscopy. Coumadin Clinic not made aware. Pt resumed warfarin on 1/12 with 7.5mg on Sun and 10mg all other days He rescheduled his INR for 1/30/24- earliest he can reschedule to

## 2024-01-30 ENCOUNTER — ANTI-COAG VISIT (OUTPATIENT)
Dept: CARDIOLOGY | Facility: CLINIC | Age: 64
End: 2024-01-30

## 2024-01-30 DIAGNOSIS — Z79.01 LONG TERM (CURRENT) USE OF ANTICOAGULANTS: Primary | ICD-10-CM

## 2024-01-30 DIAGNOSIS — D68.59 PROTEIN S DEFICIENCY: ICD-10-CM

## 2024-01-30 PROCEDURE — 93793 ANTICOAG MGMT PT WARFARIN: CPT | Mod: ,,,

## 2024-01-30 NOTE — PROGRESS NOTES
[facility]    2024 3:02 PM    Assessment/Plan:    Patient presents today with therapeutic INR.    Recommendation for patient's warfarin regimen: Continue current maintenance dose    Recommend repeat INR in 4 weeks  _________________________________________________________________    Jigar Saxena (64 y.o.) is followed by the [employer] [facility].    Anticoagulation Summary  As of 2024      INR goal:  2.0-3.0   TTR:  85.3 % (6.3 y)   INR used for dosin.56 (2024)   Warfarin maintenance plan:  7.5 mg (7.5 mg x 1) every Sun; 10 mg (10 mg x 1) all other days   Weekly warfarin total:  67.5 mg   No change documented:  Jeanette Tomlinson, PharmD   Plan last modified:  Amarjit Tompkins, PharmD (2023)   Next INR check:  2024   Target end date:      Indications    Long term (current) use of anticoagulants [Z79.01]  Protein S deficiency [D68.59]                 Anticoagulation Episode Summary       INR check location:  Clinic Lab    Preferred lab:      Send INR reminders to:  [facility] COUMADIN MONITORING POOL    Comments:  EDEL Lima          Anticoagulation Care Providers       Provider Role Specialty Phone number    Jen Laboy MD [facility] Internal Medicine [phone]

## 2024-02-27 ENCOUNTER — ANTI-COAG VISIT (OUTPATIENT)
Dept: CARDIOLOGY | Facility: CLINIC | Age: 64
End: 2024-02-27

## 2024-02-27 DIAGNOSIS — D68.59 PROTEIN S DEFICIENCY: ICD-10-CM

## 2024-02-27 DIAGNOSIS — Z79.01 LONG TERM (CURRENT) USE OF ANTICOAGULANTS: Primary | ICD-10-CM

## 2024-02-27 PROCEDURE — 93793 ANTICOAG MGMT PT WARFARIN: CPT | Mod: ,,, | Performed by: PHARMACIST

## 2024-04-02 ENCOUNTER — ANTI-COAG VISIT (OUTPATIENT)
Dept: CARDIOLOGY | Facility: CLINIC | Age: 64
End: 2024-04-02

## 2024-04-02 DIAGNOSIS — Z79.01 LONG TERM (CURRENT) USE OF ANTICOAGULANTS: Primary | ICD-10-CM

## 2024-04-02 DIAGNOSIS — D68.59 PROTEIN S DEFICIENCY: ICD-10-CM

## 2024-04-22 DIAGNOSIS — Z79.01 LONG TERM (CURRENT) USE OF ANTICOAGULANTS: ICD-10-CM

## 2024-04-22 DIAGNOSIS — D68.59 PROTEIN S DEFICIENCY: ICD-10-CM

## 2024-04-23 RX ORDER — WARFARIN 10 MG/1
10 TABLET ORAL
Qty: 48 TABLET | Refills: 3 | Status: SHIPPED | OUTPATIENT
Start: 2024-04-23 | End: 2024-06-04 | Stop reason: SDUPTHER

## 2024-05-07 ENCOUNTER — ANTI-COAG VISIT (OUTPATIENT)
Dept: CARDIOLOGY | Facility: CLINIC | Age: 64
End: 2024-05-07

## 2024-05-07 DIAGNOSIS — D68.59 PROTEIN S DEFICIENCY: ICD-10-CM

## 2024-05-07 DIAGNOSIS — Z79.01 LONG TERM (CURRENT) USE OF ANTICOAGULANTS: Primary | ICD-10-CM

## 2024-06-11 ENCOUNTER — ANTI-COAG VISIT (OUTPATIENT)
Dept: CARDIOLOGY | Facility: CLINIC | Age: 64
End: 2024-06-11

## 2024-06-11 DIAGNOSIS — Z79.01 LONG TERM (CURRENT) USE OF ANTICOAGULANTS: Primary | ICD-10-CM

## 2024-06-11 DIAGNOSIS — D68.59 PROTEIN S DEFICIENCY: ICD-10-CM

## 2024-06-11 NOTE — PROGRESS NOTES
[facility]    06/11/2024 11:15 AM    Assessment/Plan:    Patient presents today with therapeutic INR.    Assessment of patient findings and chart review: Patient denies any changes in diet, medications, or health that would effect warfarin therapy.      Recommendation for patient's warfarin regimen: Continue current maintenance dose    Recommend repeat INR in 3 weeks  _________________________________________________________________    Jigar Saxena (64 y.o.) is followed by the [employer] [facility].    Anticoagulation Summary  As of 6/11/2024      INR goal:  2.0-3.0   TTR:  84.8% (6.7 y)   INR used for dosing:  3.06 (6/11/2024)   Warfarin maintenance plan:  7.5 mg (7.5 mg x 1) every Sun; 10 mg (10 mg x 1) all other days   Weekly warfarin total:  67.5 mg   Plan last modified:  Amarjit Tompkins, PharmD (1/24/2023)   Next INR check:  7/16/2024   Target end date:      Indications    Long term (current) use of anticoagulants [Z79.01]  Protein S deficiency [D68.59]                 Anticoagulation Episode Summary       INR check location:  Clinic Lab    Preferred lab:      Send INR reminders to:  [facility] COUMADIN MONITORING POOL    Comments:  [facility] ([phone])          Anticoagulation Care Providers       Provider Role Specialty Phone number    Lisa Jiménez MD [facility] Family Medicine [phone]

## 2024-08-14 ENCOUNTER — ANTI-COAG VISIT (OUTPATIENT)
Dept: CARDIOLOGY | Facility: CLINIC | Age: 64
End: 2024-08-14

## 2024-08-14 DIAGNOSIS — D68.59 PROTEIN S DEFICIENCY: ICD-10-CM

## 2024-08-14 DIAGNOSIS — Z79.01 LONG TERM (CURRENT) USE OF ANTICOAGULANTS: Primary | ICD-10-CM

## 2024-08-14 NOTE — PROGRESS NOTES
[facility]    08/14/2024 10:21 AM    Assessment/Plan:    Patient presents today with supratherapeutic INR.    Assessment of patient findings and chart review: Patient reports he had Covid 7/31. He was eating less but resumed normal diet. He is drinking wine only twice a month at this time. He confirmed correct warfarin dose. He denies any changes to medications. He denies any bleeding or any other changes    Recommendation for patient's warfarin regimen: Hold dose today then resume current maintenance dose    Recommend repeat INR in 2 weeks  _________________________________________________________________    Jigar Saxena (64 y.o.) is followed by the [employer] [facility].    Anticoagulation Summary  As of 8/14/2024      INR goal:  2.0-3.0   TTR:  82.6% (6.9 y)   INR used for dosing:  3.86 (8/14/2024)   Warfarin maintenance plan:  7.5 mg (7.5 mg x 1) every Sun; 10 mg (10 mg x 1) all other days   Weekly warfarin total:  67.5 mg   Plan last modified:  Amarjit Tompkins, PharmD (1/24/2023)   Next INR check:     Target end date:      Indications    Long term (current) use of anticoagulants [Z79.01]  Protein S deficiency [D68.59]                 Anticoagulation Episode Summary       INR check location:  Clinic Lab    Preferred lab:      Send INR reminders to:  [facility] COUMADIN MONITORING POOL    Comments:  [facility]lan Ann ([phone])          Anticoagulation Care Providers       Provider Role Specialty Phone number    Lisa Jiménez MD [facility] Family Medicine [phone]

## 2024-08-28 ENCOUNTER — ANTI-COAG VISIT (OUTPATIENT)
Dept: CARDIOLOGY | Facility: CLINIC | Age: 64
End: 2024-08-28

## 2024-08-28 DIAGNOSIS — Z79.01 LONG TERM (CURRENT) USE OF ANTICOAGULANTS: Primary | ICD-10-CM

## 2024-08-28 DIAGNOSIS — D68.59 PROTEIN S DEFICIENCY: ICD-10-CM

## 2024-09-18 ENCOUNTER — ANTI-COAG VISIT (OUTPATIENT)
Dept: CARDIOLOGY | Facility: CLINIC | Age: 64
End: 2024-09-18

## 2024-09-18 DIAGNOSIS — Z79.01 LONG TERM (CURRENT) USE OF ANTICOAGULANTS: Primary | ICD-10-CM

## 2024-09-18 DIAGNOSIS — D68.59 PROTEIN S DEFICIENCY: ICD-10-CM

## 2024-09-18 NOTE — PROGRESS NOTES
[facility]    2024 10:26 AM    Assessment/Plan:    Patient presents today with therapeutic INR.    Assessment of patient findings and chart review: INR not at goal. Medications, chart, and patient findings reviewed. See calendar for adjustments to dose and follow up plan.      Recommendation for patient's warfarin regimen: Boost dose today to 13.75mg then resume current maintenance dose    Recommend repeat INR in 2 weeks  _________________________________________________________________    Jigar Saxena (64 y.o.) is followed by the [facility] [facility].    Anticoagulation Summary  As of 2024      INR goal:  2.0-3.0   TTR:  82.3% (7 y)   INR used for dosin.87 (2024)   Warfarin maintenance plan:  7.5 mg (7.5 mg x 1) every Sun; 10 mg (10 mg x 1) all other days   Weekly warfarin total:  67.5 mg   Plan last modified:  Amarjit Tompkins, PharmD (2023)   Next INR check:  10/2/2024   Target end date:      Indications    Long term (current) use of anticoagulants [Z79.01]  Protein S deficiency [D68.59]                 Anticoagulation Episode Summary       INR check location:  Clinic Lab    Preferred lab:      Send INR reminders to:  [facility] COUMADIN MONITORING POOL    Comments:  [facility] ([phone])          Anticoagulation Care Providers       Provider Role Specialty Phone number    Lisa Jiménez MD [facility] Family Medicine [phone]

## 2024-10-02 ENCOUNTER — ANTI-COAG VISIT (OUTPATIENT)
Dept: CARDIOLOGY | Facility: CLINIC | Age: 64
End: 2024-10-02

## 2024-10-02 DIAGNOSIS — Z79.01 LONG TERM (CURRENT) USE OF ANTICOAGULANTS: Primary | ICD-10-CM

## 2024-10-02 DIAGNOSIS — D68.59 PROTEIN S DEFICIENCY: ICD-10-CM

## 2024-10-02 NOTE — PROGRESS NOTES
[facility]    10/02/2024 3:57 PM    Assessment/Plan:    Patient presents today with supratherapeutic INR.    Assessment of patient findings and chart review: INR not at goal. Medications, chart, and patient findings reviewed. See calendar for adjustments to dose and follow up plan.      Recommendation for patient's warfarin regimen: Lower dose today to 7.5mg then resume current maintenance dose    Recommend repeat INR in 2 weeks  _________________________________________________________________    Jigar Saxena (64 y.o.) is followed by the [employer] [facility].    Anticoagulation Summary  As of 10/2/2024      INR goal:  2.0-3.0   TTR:  82.3% (7 y)   INR used for dosing:  3.10 (10/2/2024)   Warfarin maintenance plan:  7.5 mg (7.5 mg x 1) every Sun; 10 mg (10 mg x 1) all other days   Weekly warfarin total:  67.5 mg   Plan last modified:  Amarjti Tompkins, PharmD (1/24/2023)   Next INR check:  10/16/2024   Target end date:  --    Indications    Long term (current) use of anticoagulants [Z79.01]  Protein S deficiency [D68.59]                 Anticoagulation Episode Summary       INR check location:  Clinic Lab    Preferred lab:  --    Send INR reminders to:  [facility] COUMADIN MONITORING POOL    Comments:  [facility] ([phone])          Anticoagulation Care Providers       Provider Role Specialty Phone number    Lisa Jiménez MD [facility] Medicine [phone]

## 2024-10-16 ENCOUNTER — ANTI-COAG VISIT (OUTPATIENT)
Dept: CARDIOLOGY | Facility: CLINIC | Age: 64
End: 2024-10-16

## 2024-10-16 DIAGNOSIS — Z79.01 LONG TERM (CURRENT) USE OF ANTICOAGULANTS: Primary | ICD-10-CM

## 2024-10-16 DIAGNOSIS — D68.59 PROTEIN S DEFICIENCY: ICD-10-CM

## 2024-10-16 NOTE — PROGRESS NOTES
[facility]    10/16/2024 2:44 PM    Assessment/Plan:    Patient presents today with therapeutic INR.    Assessment of patient findings and chart review: reviewed    Recommendation for patient's warfarin regimen: Continue current maintenance dose    Recommend repeat INR in 2 weeks  _________________________________________________________________    Jigar Saxena (64 y.o.) is followed by the [facility].    Anticoagulation Summary  As of 10/16/2024      INR goal:  2.0-3.0   TTR:  82.2% (7 y)   INR used for dosin.71 (10/16/2024)   Warfarin maintenance plan:  7.5 mg (7.5 mg x 1) every Sun; 10 mg (10 mg x 1) all other days   Weekly warfarin total:  67.5 mg   Plan last modified:  Amarjit Tompkins, PharmD (2023)   Next INR check:  10/30/2024   Target end date:  --    Indications    Long term (current) use of anticoagulants [Z79.01]  Protein S deficiency [D68.59]                 Anticoagulation Episode Summary       INR check location:  Clinic Lab    Preferred lab:  --    Send INR reminders to:  [facility] COUMADIN MONITORING POOL    Comments:  [facility] ([phone])          Anticoagulation Care Providers       Provider Role Specialty Phone number    Lisa Jiménez MD [facility] Medicine [phone]

## 2024-10-30 ENCOUNTER — ANTI-COAG VISIT (OUTPATIENT)
Dept: CARDIOLOGY | Facility: CLINIC | Age: 64
End: 2024-10-30

## 2024-10-30 DIAGNOSIS — Z79.01 LONG TERM (CURRENT) USE OF ANTICOAGULANTS: Primary | ICD-10-CM

## 2024-10-30 DIAGNOSIS — D68.59 PROTEIN S DEFICIENCY: ICD-10-CM

## 2024-11-27 ENCOUNTER — ANTI-COAG VISIT (OUTPATIENT)
Dept: CARDIOLOGY | Facility: CLINIC | Age: 64
End: 2024-11-27

## 2024-11-27 DIAGNOSIS — D68.59 PROTEIN S DEFICIENCY: ICD-10-CM

## 2024-11-27 DIAGNOSIS — Z79.01 LONG TERM (CURRENT) USE OF ANTICOAGULANTS: Primary | ICD-10-CM

## 2024-11-27 NOTE — PROGRESS NOTES
[facility]    2024 10:20 AM    Assessment/Plan:    Patient presents today with therapeutic INR.    Assessment of patient findings and chart review: INR at goal. Medications and chart reviewed. No changes noted to necessitate adjustment to warfarin regimen.     Recommendation for patient's warfarin regimen: Continue current maintenance dose    Recommend repeat INR in 3 weeks  _________________________________________________________________    Jigar Saxena (64 y.o.) is followed by the [employer] [facility].    Anticoagulation Summary  As of 2024      INR goal:  2.0-3.0   TTR:  82.5% (7.1 y)   INR used for dosin.14 (2024)   Warfarin maintenance plan:  7.5 mg (7.5 mg x 1) every Sun; 10 mg (10 mg x 1) all other days   Weekly warfarin total:  67.5 mg   Plan last modified:  Amarjit Tompkins, PharmD (2023)   Next INR check:  2024   Target end date:  --    Indications    Long term (current) use of anticoagulants [Z79.01]  Protein S deficiency [D68.59]                 Anticoagulation Episode Summary       INR check location:  Clinic Lab    Preferred lab:  --    Send INR reminders to:  [facility] COUMADIN MONITORING POOL    Comments:  [facility] ([phone])          Anticoagulation Care Providers       Provider Role Specialty Phone number    Lisa Jiménez MD [facility] Family Medicine [phone]

## 2024-12-18 ENCOUNTER — ANTI-COAG VISIT (OUTPATIENT)
Dept: CARDIOLOGY | Facility: CLINIC | Age: 64
End: 2024-12-18

## 2024-12-18 DIAGNOSIS — D68.59 PROTEIN S DEFICIENCY: ICD-10-CM

## 2024-12-18 DIAGNOSIS — Z79.01 LONG TERM (CURRENT) USE OF ANTICOAGULANTS: Primary | ICD-10-CM

## 2024-12-18 NOTE — PROGRESS NOTES
[facility]    2024 11:10 AM    Assessment/Plan:    Patient presents today with therapeutic INR.    Assessment of patient findings and chart review: INR at goal. Medications and chart reviewed. No changes noted to necessitate adjustment of warfarin or follow-up plan. See calendar.      Recommendation for patient's warfarin regimen: Continue current maintenance dose    Recommend repeat INR in 4 weeks  _________________________________________________________________    Jigar Saxena (64 y.o.) is followed by the [employer] [facility].    Anticoagulation Summary  As of 2024      INR goal:  2.0-3.0   TTR:  82.6% (7.2 y)   INR used for dosin.12 (2024)   Warfarin maintenance plan:  7.5 mg (7.5 mg x 1) every Sun; 10 mg (10 mg x 1) all other days   Weekly warfarin total:  67.5 mg   Plan last modified:  Amarjit Tompkins, PharmD (2023)   Next INR check:  1/15/2025   Target end date:  --    Indications    Long term (current) use of anticoagulants [Z79.01]  Protein S deficiency [D68.59]                 Anticoagulation Episode Summary       INR check location:  Clinic Lab    Preferred lab:  --    Send INR reminders to:  [facility] COUMADIN MONITORING POOL    Comments:  [facility] ([phone])          Anticoagulation Care Providers       Provider Role Specialty Phone number    Lisa Jiménez MD [facility] Family Medicine [phone]

## 2025-01-15 ENCOUNTER — ANTI-COAG VISIT (OUTPATIENT)
Dept: CARDIOLOGY | Facility: CLINIC | Age: 65
End: 2025-01-15

## 2025-01-15 DIAGNOSIS — Z79.01 LONG TERM (CURRENT) USE OF ANTICOAGULANTS: Primary | ICD-10-CM

## 2025-01-15 DIAGNOSIS — D68.59 PROTEIN S DEFICIENCY: ICD-10-CM

## 2025-02-07 ENCOUNTER — ANTI-COAG VISIT (OUTPATIENT)
Dept: CARDIOLOGY | Facility: CLINIC | Age: 65
End: 2025-02-07

## 2025-02-07 DIAGNOSIS — Z79.01 LONG TERM (CURRENT) USE OF ANTICOAGULANTS: Primary | ICD-10-CM

## 2025-02-07 DIAGNOSIS — D68.59 PROTEIN S DEFICIENCY: ICD-10-CM

## 2025-02-07 DIAGNOSIS — Z79.01 LONG TERM CURRENT USE OF ANTICOAGULANT THERAPY: ICD-10-CM

## 2025-02-07 PROCEDURE — 93793 ANTICOAG MGMT PT WARFARIN: CPT | Mod: ,,,

## 2025-02-07 NOTE — PROGRESS NOTES
[facility]    2025 2:03 PM    Assessment/Plan:    Patient presents today with therapeutic INR.    Recommendation for patient's warfarin regimen: Continue current maintenance dose    Recommend repeat INR in 5 weeks  _________________________________________________________________    Jigar Saxena (65 y.o.) is followed by the [employer] [facility].    Anticoagulation Summary  As of 2025      INR goal:  2.0-3.0   TTR:  83.0% (7.3 y)   INR used for dosin.11 (2025)   Warfarin maintenance plan:  7.5 mg (7.5 mg x 1) every Sun; 10 mg (10 mg x 1) all other days   Weekly warfarin total:  67.5 mg   No change documented:  Jeanette Tomlinson, PharmD   Plan last modified:  Amarjit Tompkins, PharmD (2023)   Next INR check:  3/13/2025   Target end date:  --    Indications    Long term (current) use of anticoagulants [Z79.01]  Protein S deficiency [D68.59]                 Anticoagulation Episode Summary       INR check location:  Clinic Lab    Preferred lab:  --    Send INR reminders to:  [facility] COUMADIN MONITORING POOL    Comments:  [facility] [place] [place] ([phone])          Anticoagulation Care Providers       Provider Role Specialty Phone number    Lisa Jiménez MD [facility] Family Medicine [phone]

## 2025-03-03 RX ORDER — WARFARIN 7.5 MG/1
7.5 TABLET ORAL
Qty: 20 TABLET | Refills: 5 | Status: SHIPPED | OUTPATIENT
Start: 2025-03-03

## 2025-03-03 NOTE — PROGRESS NOTES
3/3- Pt requested a refill on his 7.5 mg tablets be sent to the [facility] located at [address] [place] [address], and they can be reached at [phone].

## 2025-03-14 ENCOUNTER — ANTI-COAG VISIT (OUTPATIENT)
Dept: CARDIOLOGY | Facility: CLINIC | Age: 65
End: 2025-03-14

## 2025-03-14 DIAGNOSIS — Z79.01 LONG TERM (CURRENT) USE OF ANTICOAGULANTS: Primary | ICD-10-CM

## 2025-03-14 DIAGNOSIS — D68.59 PROTEIN S DEFICIENCY: ICD-10-CM

## 2025-03-14 NOTE — PROGRESS NOTES
INR not at goal. Medications, chart, and patient findings reviewed. See calendar for adjustments to dose and follow up plan.  Patient was re-educated on situations that would require placing a call to the coumadin clinic, including bleeding or unusual bruising issues, changes in health, diet or medications, upcoming procedures that require warfarin interruption, and missed coumadin dose(s). Patient expressed understanding that avoidance of consistency with these parameters could cause fluctuations in INR, leading to more frequent visits and increase risk of adverse events.

## 2025-04-02 ENCOUNTER — ANTI-COAG VISIT (OUTPATIENT)
Dept: CARDIOLOGY | Facility: CLINIC | Age: 65
End: 2025-04-02

## 2025-04-02 DIAGNOSIS — D68.59 PROTEIN S DEFICIENCY: ICD-10-CM

## 2025-04-02 DIAGNOSIS — Z79.01 LONG TERM (CURRENT) USE OF ANTICOAGULANTS: Primary | ICD-10-CM

## 2025-04-02 NOTE — PROGRESS NOTES
[facility]    2025 2:34 PM    Assessment/Plan:    Patient presents today with therapeutic INR.    Assessment of patient findings and chart review: INR at goal. Medications and chart reviewed. No changes noted to necessitate adjustment of warfarin or follow-up plan. See calendar.      Recommendation for patient's warfarin regimen: Continue current maintenance dose    Recommend repeat INR in 4 weeks  _________________________________________________________________    Jigar Saxena (65 y.o.) is followed by the [facility].    Anticoagulation Summary  As of 2025      INR goal:  2.0-3.0   TTR:  83.1% (7.5 y)   INR used for dosin.44 (2025)   Warfarin maintenance plan:  7.5 mg (7.5 mg x 1) every Sun; 10 mg (10 mg x 1) all other days   Weekly warfarin total:  67.5 mg   Plan last modified:  Amarjit Tompkins, PharmD (2023)   Next INR check:  2025   Target end date:  --    Indications    Long term (current) use of anticoagulants [Z79.01]  Protein S deficiency [D68.59]                 Anticoagulation Episode Summary       INR check location:  Clinic Lab    Preferred lab:  --    Send INR reminders to:  [facility] COUMADIN MONITORING POOL    Comments:  [facility] [place] ([phone])          Anticoagulation Care Providers       Provider Role Specialty Phone number    Lisa Jiménez MD [facility] [phone]

## 2025-04-30 ENCOUNTER — ANTI-COAG VISIT (OUTPATIENT)
Dept: CARDIOLOGY | Facility: CLINIC | Age: 65
End: 2025-04-30

## 2025-04-30 DIAGNOSIS — D68.59 PROTEIN S DEFICIENCY: ICD-10-CM

## 2025-04-30 DIAGNOSIS — Z79.01 LONG TERM (CURRENT) USE OF ANTICOAGULANTS: Primary | ICD-10-CM

## 2025-04-30 NOTE — PROGRESS NOTES
[facility]    2025 2:47 PM    Assessment/Plan:    Patient presents today with therapeutic INR.    Assessment of patient findings and chart review: INR at goal. Medications and chart reviewed. No changes noted to necessitate adjustment of warfarin or follow-up plan. See calendar.      Recommendation for patient's warfarin regimen: Continue current maintenance dose    Recommend repeat INR in 4 weeks  _________________________________________________________________    Jigar Saxena (65 y.o.) is followed by the [facility].    Anticoagulation Summary  As of 2025      INR goal:  2.0-3.0   TTR:  83.3% (7.6 y)   INR used for dosin.66 (2025)   Warfarin maintenance plan:  7.5 mg (7.5 mg x 1) every Sun; 10 mg (10 mg x 1) all other days   Weekly warfarin total:  67.5 mg   Plan last modified:  Amarjit Tompkins, PharmD (2023)   Next INR check:  2025   Target end date:  --    Indications    Long term (current) use of anticoagulants [Z79.01]  Protein S deficiency [D68.59]                 Anticoagulation Episode Summary       INR check location:  Clinic Lab    Preferred lab:  --    Send INR reminders to:  [facility] COUMADIN MONITORING POOL    Comments:  [facility] [place] ([phone])          Anticoagulation Care Providers       Provider Role Specialty Phone number    Lisa Jiménez MD [facility] [phone]

## 2025-05-12 DIAGNOSIS — D68.59 PROTEIN S DEFICIENCY: ICD-10-CM

## 2025-05-12 DIAGNOSIS — Z79.01 LONG TERM CURRENT USE OF ANTICOAGULANT THERAPY: ICD-10-CM

## 2025-05-13 RX ORDER — WARFARIN 7.5 MG/1
TABLET ORAL
Qty: 10 TABLET | Refills: 0 | Status: SHIPPED | OUTPATIENT
Start: 2025-05-19

## 2025-06-04 ENCOUNTER — ANTI-COAG VISIT (OUTPATIENT)
Dept: CARDIOLOGY | Facility: CLINIC | Age: 65
End: 2025-06-04

## 2025-06-04 DIAGNOSIS — D68.59 PROTEIN S DEFICIENCY: ICD-10-CM

## 2025-06-04 DIAGNOSIS — Z79.01 LONG TERM (CURRENT) USE OF ANTICOAGULANTS: Primary | ICD-10-CM

## 2025-07-09 ENCOUNTER — ANTI-COAG VISIT (OUTPATIENT)
Dept: CARDIOLOGY | Facility: CLINIC | Age: 65
End: 2025-07-09

## 2025-07-09 DIAGNOSIS — Z79.01 LONG TERM (CURRENT) USE OF ANTICOAGULANTS: Primary | ICD-10-CM

## 2025-07-09 DIAGNOSIS — D68.59 PROTEIN S DEFICIENCY: ICD-10-CM

## 2025-07-09 NOTE — PROGRESS NOTES
[facility]    2025 2:31 PM    Assessment/Plan:    Patient presents today with therapeutic INR.    Recommendation for patient's warfarin regimen: Continue current maintenance dose    Recommend repeat INR in 6 weeks  _________________________________________________________________    Jigar Saxena (65 y.o.) is followed by the [facility].    Anticoagulation Summary  As of 2025      INR goal:  2.0-3.0   TTR:  83.7% (7.8 y)   INR used for dosin.07 (2025)   Warfarin maintenance plan:  7.5 mg (7.5 mg x 1) every Sun; 10 mg (10 mg x 1) all other days   Weekly warfarin total:  67.5 mg   No change documented:  Jeanette Tomlinson, PharmD   Plan last modified:  Amarjit Tompkins, PharmD (2023)   Next INR check:  2025   Target end date:  --    Indications    Long term (current) use of anticoagulants [Z79.01]  Protein S deficiency [D68.59]                 Anticoagulation Episode Summary       INR check location:  Clinic Lab    Preferred lab:  --    Send INR reminders to:  [facility] COUMADIN MONITORING POOL    Comments:  [facility] [place]lan Ann ([phone])          Anticoagulation Care Providers       Provider Role Specialty Phone number    Lisa Jiménez MD [facility] [phone]

## 2025-07-21 DIAGNOSIS — Z79.01 LONG TERM CURRENT USE OF ANTICOAGULANT THERAPY: ICD-10-CM

## 2025-07-21 DIAGNOSIS — D68.59 PROTEIN S DEFICIENCY: ICD-10-CM

## 2025-07-21 RX ORDER — WARFARIN 7.5 MG/1
TABLET ORAL
Qty: 10 TABLET | Refills: 5 | Status: SHIPPED | OUTPATIENT
Start: 2025-07-21

## 2025-08-19 ENCOUNTER — ANTI-COAG VISIT (OUTPATIENT)
Dept: CARDIOLOGY | Facility: CLINIC | Age: 65
End: 2025-08-19

## 2025-08-19 DIAGNOSIS — Z79.01 LONG TERM (CURRENT) USE OF ANTICOAGULANTS: Primary | ICD-10-CM

## 2025-08-19 DIAGNOSIS — D68.59 PROTEIN S DEFICIENCY: ICD-10-CM
